# Patient Record
Sex: FEMALE | Race: WHITE | Employment: FULL TIME | URBAN - METROPOLITAN AREA
[De-identification: names, ages, dates, MRNs, and addresses within clinical notes are randomized per-mention and may not be internally consistent; named-entity substitution may affect disease eponyms.]

---

## 2017-06-07 ENCOUNTER — TELEPHONE (OUTPATIENT)
Dept: INTERNAL MEDICINE CLINIC | Facility: CLINIC | Age: 57
End: 2017-06-07

## 2017-06-07 DIAGNOSIS — Z12.39 SCREENING FOR MALIGNANT NEOPLASM OF BREAST: Primary | ICD-10-CM

## 2017-06-07 DIAGNOSIS — Z00.00 ROUTINE GENERAL MEDICAL EXAMINATION AT HEALTH CARE FACILITY: ICD-10-CM

## 2017-06-07 NOTE — TELEPHONE ENCOUNTER
Left message on pt's voice mail. Pt asked to call back with condition update. Last mammogram 7/8/16: Findings: Negative; Recommend: Screening mammogram in one year.

## 2017-06-07 NOTE — TELEPHONE ENCOUNTER
Patient phoned to schedule her physical, and this was done for 17. Patient did not get her labs done last year, as instructed by Dr. Ariane Burrows, and the orders are now . Does Dr. Ariane Burrows want her to go for these labs now?   If so, she'll need new ord

## 2017-07-10 NOTE — ADDENDUM NOTE
Encounter addended by: Wendy Ackerman LPN on: 2/47/2506  5:19 AM<BR>    Actions taken: Letter status changed

## 2017-07-14 ENCOUNTER — HOSPITAL ENCOUNTER (OUTPATIENT)
Dept: MAMMOGRAPHY | Age: 57
Discharge: HOME | End: 2017-07-14
Attending: INTERNAL MEDICINE
Payer: COMMERCIAL

## 2017-07-14 ENCOUNTER — LAB ENCOUNTER (OUTPATIENT)
Dept: LAB | Age: 57
End: 2017-07-14
Attending: INTERNAL MEDICINE
Payer: COMMERCIAL

## 2017-07-14 DIAGNOSIS — Z00.00 ROUTINE GENERAL MEDICAL EXAMINATION AT HEALTH CARE FACILITY: ICD-10-CM

## 2017-07-14 DIAGNOSIS — Z12.39 SCREENING FOR MALIGNANT NEOPLASM OF BREAST: ICD-10-CM

## 2017-07-14 LAB
ALBUMIN SERPL-MCNC: 4 G/DL (ref 3.5–4.8)
ALP LIVER SERPL-CCNC: 92 U/L (ref 46–118)
ALT SERPL-CCNC: 22 U/L (ref 14–54)
AST SERPL-CCNC: 16 U/L (ref 15–41)
BASOPHILS # BLD AUTO: 0.03 X10(3) UL (ref 0–0.1)
BASOPHILS NFR BLD AUTO: 0.8 %
BILIRUB SERPL-MCNC: 0.6 MG/DL (ref 0.1–2)
BUN BLD-MCNC: 13 MG/DL (ref 8–20)
CALCIUM BLD-MCNC: 9.6 MG/DL (ref 8.3–10.3)
CHLORIDE: 106 MMOL/L (ref 101–111)
CHOLEST SMN-MCNC: 201 MG/DL (ref ?–200)
CO2: 27 MMOL/L (ref 22–32)
CREAT BLD-MCNC: 0.85 MG/DL (ref 0.55–1.02)
EOSINOPHIL # BLD AUTO: 0.13 X10(3) UL (ref 0–0.3)
EOSINOPHIL NFR BLD AUTO: 3.3 %
ERYTHROCYTE [DISTWIDTH] IN BLOOD BY AUTOMATED COUNT: 12.2 % (ref 11.5–16)
GLUCOSE BLD-MCNC: 82 MG/DL (ref 70–99)
HCT VFR BLD AUTO: 40.4 % (ref 34–50)
HDLC SERPL-MCNC: 109 MG/DL (ref 45–?)
HDLC SERPL: 1.84 {RATIO} (ref ?–4.44)
HGB BLD-MCNC: 13.5 G/DL (ref 12–16)
IMMATURE GRANULOCYTE COUNT: 0.01 X10(3) UL (ref 0–1)
IMMATURE GRANULOCYTE RATIO %: 0.3 %
LDLC SERPL CALC-MCNC: 81 MG/DL (ref ?–130)
LYMPHOCYTES # BLD AUTO: 1.39 X10(3) UL (ref 0.9–4)
LYMPHOCYTES NFR BLD AUTO: 35.3 %
M PROTEIN MFR SERPL ELPH: 7.2 G/DL (ref 6.1–8.3)
MCH RBC QN AUTO: 33 PG (ref 27–33.2)
MCHC RBC AUTO-ENTMCNC: 33.4 G/DL (ref 31–37)
MCV RBC AUTO: 98.8 FL (ref 81–100)
MONOCYTES # BLD AUTO: 0.51 X10(3) UL (ref 0.1–0.6)
MONOCYTES NFR BLD AUTO: 12.9 %
NEUTROPHIL ABS PRELIM: 1.87 X10 (3) UL (ref 1.3–6.7)
NEUTROPHILS # BLD AUTO: 1.87 X10(3) UL (ref 1.3–6.7)
NEUTROPHILS NFR BLD AUTO: 47.4 %
NONHDLC SERPL-MCNC: 92 MG/DL (ref ?–130)
PLATELET # BLD AUTO: 231 10(3)UL (ref 150–450)
POTASSIUM SERPL-SCNC: 4.4 MMOL/L (ref 3.6–5.1)
RBC # BLD AUTO: 4.09 X10(6)UL (ref 3.8–5.1)
RED CELL DISTRIBUTION WIDTH-SD: 43.9 FL (ref 35.1–46.3)
SODIUM SERPL-SCNC: 141 MMOL/L (ref 136–144)
TRIGLYCERIDES: 54 MG/DL (ref ?–150)
TSI SER-ACNC: 1.13 MIU/ML (ref 0.35–5.5)
VLDL: 11 MG/DL (ref 5–40)
WBC # BLD AUTO: 3.9 X10(3) UL (ref 4–13)

## 2017-07-14 PROCEDURE — 36415 COLL VENOUS BLD VENIPUNCTURE: CPT | Performed by: INTERNAL MEDICINE

## 2017-07-14 PROCEDURE — 77067 SCR MAMMO BI INCL CAD: CPT | Performed by: INTERNAL MEDICINE

## 2017-07-14 PROCEDURE — 80061 LIPID PANEL: CPT | Performed by: INTERNAL MEDICINE

## 2017-07-14 PROCEDURE — 80050 GENERAL HEALTH PANEL: CPT | Performed by: INTERNAL MEDICINE

## 2017-08-24 ENCOUNTER — OFFICE VISIT (OUTPATIENT)
Dept: INTERNAL MEDICINE CLINIC | Facility: CLINIC | Age: 57
End: 2017-08-24

## 2017-08-24 VITALS
RESPIRATION RATE: 16 BRPM | TEMPERATURE: 98 F | OXYGEN SATURATION: 99 % | HEART RATE: 64 BPM | WEIGHT: 120 LBS | HEIGHT: 63.75 IN | SYSTOLIC BLOOD PRESSURE: 116 MMHG | BODY MASS INDEX: 20.74 KG/M2 | DIASTOLIC BLOOD PRESSURE: 76 MMHG

## 2017-08-24 DIAGNOSIS — Z12.4 PAP SMEAR FOR CERVICAL CANCER SCREENING: ICD-10-CM

## 2017-08-24 DIAGNOSIS — Z00.00 ROUTINE GENERAL MEDICAL EXAMINATION AT A HEALTH CARE FACILITY: Primary | ICD-10-CM

## 2017-08-24 PROCEDURE — 88175 CYTOPATH C/V AUTO FLUID REDO: CPT | Performed by: INTERNAL MEDICINE

## 2017-08-24 PROCEDURE — 99396 PREV VISIT EST AGE 40-64: CPT | Performed by: INTERNAL MEDICINE

## 2017-08-24 PROCEDURE — 87624 HPV HI-RISK TYP POOLED RSLT: CPT | Performed by: INTERNAL MEDICINE

## 2017-08-24 NOTE — PROGRESS NOTES
HPI:   Paulette Manzanares is a 65year old female who presents for a complete physical exam. \    Wt Readings from Last 6 Encounters:  08/24/17 : 120 lb  08/22/16 : 118 lb 14.4 oz  02/12/16 : 118 lb  11/03/15 : 116 lb 12.8 oz  03/02/15 : 115 lb  02/16/15 : 112 shoulder girdle pain not so bad lately  NEURO: denies headaches, dizziness  PSYCHE: denies depression or anxiety  HEMATOLOGIC: denies hx of anemia  ENDOCRINE: denies thyroid history  ALL/ASTHMA: denies hx of allergy or asthma    EXAM:   /76 (BP Locat for this visit. Meds & Refills for this Visit:    No prescriptions requested or ordered in this encounter    Imaging & Consults:  None    No Follow-up on file.

## 2017-08-29 LAB — HPV I/H RISK 1 DNA SPEC QL NAA+PROBE: NEGATIVE

## 2017-09-15 ENCOUNTER — TELEPHONE (OUTPATIENT)
Dept: INTERNAL MEDICINE CLINIC | Facility: CLINIC | Age: 57
End: 2017-09-15

## 2017-09-15 NOTE — TELEPHONE ENCOUNTER
Patient called, wanted to talk to a nurse, she is having a low grade fever on and off through out the week. Patient has a really bad cough, its constant and its hard to talk sleep and eat.  Patient wanted to know if she needs to come in or if she can try an

## 2017-09-15 NOTE — TELEPHONE ENCOUNTER
Spoke to pt, has cough x 1 week, feelws like is post nasal drip and a cold. Pt has been taking NYquil. Did not want to make OV. Advised to monitor symptoms and go to wic if worsening over the weekend.  Advised to push fluids, take motrin for fever and ac

## 2017-09-17 ENCOUNTER — HOSPITAL ENCOUNTER (OUTPATIENT)
Age: 57
Discharge: HOME | End: 2017-09-17
Attending: FAMILY MEDICINE
Payer: COMMERCIAL

## 2017-09-17 ENCOUNTER — APPOINTMENT (OUTPATIENT)
Dept: GENERAL RADIOLOGY | Age: 57
End: 2017-09-17
Attending: FAMILY MEDICINE
Payer: COMMERCIAL

## 2017-09-17 VITALS
SYSTOLIC BLOOD PRESSURE: 140 MMHG | WEIGHT: 120 LBS | TEMPERATURE: 100 F | RESPIRATION RATE: 16 BRPM | DIASTOLIC BLOOD PRESSURE: 90 MMHG | OXYGEN SATURATION: 98 % | HEART RATE: 81 BPM | HEIGHT: 64 IN | BODY MASS INDEX: 20.49 KG/M2

## 2017-09-17 DIAGNOSIS — J20.8 ACUTE BRONCHITIS DUE TO OTHER SPECIFIED ORGANISMS: Primary | ICD-10-CM

## 2017-09-17 PROCEDURE — 99213 OFFICE O/P EST LOW 20 MIN: CPT

## 2017-09-17 PROCEDURE — 99204 OFFICE O/P NEW MOD 45 MIN: CPT

## 2017-09-17 PROCEDURE — 71020 XR CHEST PA + LAT CHEST (CPT=71020): CPT | Performed by: FAMILY MEDICINE

## 2017-09-17 RX ORDER — GUAIFENESIN AND PSEUDOEPHEDRINE HCL 1200; 120 MG/1; MG/1
1 TABLET, EXTENDED RELEASE ORAL 2 TIMES DAILY
Qty: 28 TABLET | Refills: 0 | Status: SHIPPED | OUTPATIENT
Start: 2017-09-17 | End: 2017-09-24

## 2017-09-17 NOTE — ED INITIAL ASSESSMENT (HPI)
C/o dry cough, body aches, low grade fevers. C/o sinus congestion. Has been sick since Wednesday. Taking Nyquil, Mucinex and Advil Sinus.

## 2017-09-17 NOTE — ED PROVIDER NOTES
Patient Seen in: [address]    History   Patient presents with:  Cough/URI    Stated Complaint: cough headache     HPI    Patient with 4 days of nonproductive cough, headache, and generalized myalgia.   Patient has had tacti No murmur  Lungs: CTA bilateral. No wheezes rales or rhonchi. Skin: Warm and dry  Neuro: A&O x3      ED Course   Labs Reviewed - No data to display  Radiology results reviewed.  No Acute Findings  =========================================================== 0    Pseudoephedrine-Guaifenesin -1200 MG Oral Tablet 12 Hr  Take 1 tablet by mouth 2 (two) times daily.   Qty: 28 tablet Refills: 0

## 2017-09-18 ENCOUNTER — TELEPHONE (OUTPATIENT)
Dept: INTERNAL MEDICINE CLINIC | Facility: CLINIC | Age: 57
End: 2017-09-18

## 2017-09-18 NOTE — TELEPHONE ENCOUNTER
Spoke with pt. States that she is taking Cheratussin at night and it helps her cough. States that during the day she has persistent , non-productive cough. Denies  Chest pain, SOB or wheezing. Reviewed chest x-ray results See Imaging 9/17/17.   Pt states

## 2017-09-18 NOTE — TELEPHONE ENCOUNTER
[facility], states that since then she has had left side pain, not sure if it is from coughing? Please advise. Also pt is wondering if she needs antibiotic, was not given [facility].  Thank you

## 2017-11-28 ENCOUNTER — TELEPHONE (OUTPATIENT)
Dept: INTERNAL MEDICINE CLINIC | Facility: CLINIC | Age: 57
End: 2017-11-28

## 2017-11-28 NOTE — TELEPHONE ENCOUNTER
Patient had bronchitis in the fall and ended up hurting her ribs from coughing. She has started coughing again and has the same pain in her ribs. She is wondering what she can do for the pain. Please advise.

## 2017-11-28 NOTE — TELEPHONE ENCOUNTER
Spoke with pt, she has concerns with cost of urgent care.  No current appts for tomorrow, advised she call back or can schedule for Thursday and she can check in tomorrow and we can place her on wait list. Also advised that she could go to [facility] but they have

## 2017-11-28 NOTE — TELEPHONE ENCOUNTER
Spoke with pt, has had cough for 2 days, fever approx 101F, pt most concerned about her ribs hurting from coughing. No wheezing or sob. Able to speak in clear complete sentences. No coughing while on phone call.  Had bronchitis couple months ago and hurt ri

## 2017-11-29 ENCOUNTER — OFFICE VISIT (OUTPATIENT)
Dept: FAMILY MEDICINE CLINIC | Facility: CLINIC | Age: 57
End: 2017-11-29

## 2017-11-29 VITALS
HEART RATE: 96 BPM | SYSTOLIC BLOOD PRESSURE: 90 MMHG | HEIGHT: 63.75 IN | OXYGEN SATURATION: 98 % | DIASTOLIC BLOOD PRESSURE: 64 MMHG | WEIGHT: 120 LBS | TEMPERATURE: 99 F | RESPIRATION RATE: 16 BRPM | BODY MASS INDEX: 20.74 KG/M2

## 2017-11-29 DIAGNOSIS — R05.9 COUGH: ICD-10-CM

## 2017-11-29 DIAGNOSIS — R50.9 FEVER IN ADULT: ICD-10-CM

## 2017-11-29 DIAGNOSIS — J02.9 ACUTE PHARYNGITIS, UNSPECIFIED ETIOLOGY: Primary | ICD-10-CM

## 2017-11-29 PROCEDURE — 87880 STREP A ASSAY W/OPTIC: CPT | Performed by: NURSE PRACTITIONER

## 2017-11-29 PROCEDURE — 99213 OFFICE O/P EST LOW 20 MIN: CPT | Performed by: NURSE PRACTITIONER

## 2017-11-29 PROCEDURE — 87081 CULTURE SCREEN ONLY: CPT | Performed by: NURSE PRACTITIONER

## 2017-11-29 RX ORDER — BENZONATATE 200 MG/1
200 CAPSULE ORAL 3 TIMES DAILY PRN
Qty: 20 CAPSULE | Refills: 0 | Status: SHIPPED | OUTPATIENT
Start: 2017-11-29 | End: 2017-12-06

## 2017-11-29 RX ORDER — CODEINE PHOSPHATE AND GUAIFENESIN 10; 100 MG/5ML; MG/5ML
SOLUTION ORAL NIGHTLY PRN
Qty: 120 ML | Refills: 0 | Status: SHIPPED | OUTPATIENT
Start: 2017-11-29 | End: 2017-12-09

## 2017-11-29 RX ORDER — AZITHROMYCIN 250 MG/1
TABLET, FILM COATED ORAL
Qty: 6 TABLET | Refills: 0 | Status: SHIPPED | OUTPATIENT
Start: 2017-11-29 | End: 2018-08-28 | Stop reason: ALTCHOICE

## 2017-11-29 NOTE — PATIENT INSTRUCTIONS
Pharyngitis (Sore Throat), Report Pending    Pharyngitis (sore throat) is often due to a virus. It can also be caused by the streptococcus, or strep, bacterium, often called strep throat.  Both viral and strep infections can cause throat pain that is wors · For children: Use acetaminophen for fever, fussiness, or discomfort.  In infants older than 10months of age, you may use ibuprofen instead of acetaminophen. Talk with your child's healthcare provider before giving these medicines if your child has chronic · Signs of dehydration (very dark urine or no urine, sunken eyes, dizziness)  · Trouble breathing or noisy breathing  · Muffled voice  · New rash  · Child appears to be getting sicker  Date Last Reviewed: 4/13/2015  © 2714-9521 [employer].  8 If you are 72 or older, you should get a pneumococcal vaccine and a yearly flu (influenza) shot. You should also get these vaccines if you have chronic lung disease like asthma, emphysema, or COPD.  Recently, a second type of pneumonia vaccine has become av

## 2017-11-29 NOTE — PROGRESS NOTES
CHIEF COMPLAINT:   Patient presents with:  Sore Throat  Cough        HPI:   Elli Licona is a 65year old female presents to clinic with complaint of sore throat, fever, and cough. Patient has had for 3 days.  Patient reports following associated symptom NOSE: nostrils patent, no exudates, nasal mucosa pink and noninflamed  THROAT: oral mucosa pink, moist. Posterior pharynx erythematous and injected. No exudates. Tonsils +2/4. Breath is not malodorous.   No trismus, hoarseness, muffled voice, stridor, or u - azithromycin 250 MG Oral Tab; Take 2 tablets on day 1, then 1 tablet on days 2-5. Dispense: 6 tablet; Refill: 0  - benzonatate 200 MG Oral Cap; Take 1 capsule (200 mg total) by mouth 3 (three) times daily as needed for cough. Swallow whole.   Do not open · If the test is positive for strep, don't go to work or school for the first 2 days of taking the antibiotics. After this time, you will not be contagious.  You can then return to work or school if you are feeling better.   · Take the antibiotic medicine f ¨ Your child is of any age and has repeated fevers above 104°F (40°C). ¨ Your child is younger than 3years of age and has a fever of 100.4°F (38°C) that continues for more than 1 day.   ¨ Your child is 3years old or older and has a fever of 100.4°F (38°C · Drink 6 to 8 glasses of fluids every day to make sure you are getting enough fluids. Beverages can include water, sport drinks, sodas without caffeine, juices, tea, or soup. Fluids will help loosen secretions in the lung.  This will make it easier for you The patien indicates understanding of these issues and agrees to the plan.

## 2018-08-06 ENCOUNTER — TELEPHONE (OUTPATIENT)
Dept: INTERNAL MEDICINE CLINIC | Facility: CLINIC | Age: 58
End: 2018-08-06

## 2018-08-06 DIAGNOSIS — Z12.39 SCREENING FOR MALIGNANT NEOPLASM OF BREAST: Primary | ICD-10-CM

## 2018-08-06 NOTE — TELEPHONE ENCOUNTER
Patient was wondering if Dr. Bebe Frias would order mammo before PE on 8/28/18. Patient due for one. Okay to place? ? Routed to Dr. Bebe Frias.

## 2018-08-06 NOTE — TELEPHONE ENCOUNTER
Patient called, she went to schedule her mammogram, and they told her at central scheduling there was not one in the system. Patient had her last mammogram on July, wanted a order now since she is due.  Advised that she needs to have a physical scheduled, w

## 2018-08-10 ENCOUNTER — HOSPITAL ENCOUNTER (OUTPATIENT)
Dept: MAMMOGRAPHY | Age: 58
Discharge: HOME | End: 2018-08-10
Attending: INTERNAL MEDICINE
Payer: COMMERCIAL

## 2018-08-10 DIAGNOSIS — Z12.39 SCREENING FOR MALIGNANT NEOPLASM OF BREAST: ICD-10-CM

## 2018-08-10 PROCEDURE — 77067 SCR MAMMO BI INCL CAD: CPT | Performed by: INTERNAL MEDICINE

## 2018-08-10 PROCEDURE — 77063 BREAST TOMOSYNTHESIS BI: CPT | Performed by: INTERNAL MEDICINE

## 2018-08-28 ENCOUNTER — OFFICE VISIT (OUTPATIENT)
Dept: INTERNAL MEDICINE CLINIC | Facility: CLINIC | Age: 58
End: 2018-08-28
Payer: COMMERCIAL

## 2018-08-28 VITALS
TEMPERATURE: 98 F | HEIGHT: 64 IN | SYSTOLIC BLOOD PRESSURE: 116 MMHG | OXYGEN SATURATION: 98 % | HEART RATE: 62 BPM | RESPIRATION RATE: 14 BRPM | DIASTOLIC BLOOD PRESSURE: 68 MMHG | WEIGHT: 120.81 LBS | BODY MASS INDEX: 20.63 KG/M2

## 2018-08-28 DIAGNOSIS — Z00.00 ROUTINE GENERAL MEDICAL EXAMINATION AT A HEALTH CARE FACILITY: ICD-10-CM

## 2018-08-28 DIAGNOSIS — Z12.31 ENCOUNTER FOR SCREENING MAMMOGRAM FOR BREAST CANCER: Primary | ICD-10-CM

## 2018-08-28 DIAGNOSIS — Z12.4 ENCOUNTER FOR SCREENING FOR CERVICAL CANCER: ICD-10-CM

## 2018-08-28 DIAGNOSIS — M72.2 PLANTAR FASCIITIS OF RIGHT FOOT: ICD-10-CM

## 2018-08-28 PROCEDURE — 99396 PREV VISIT EST AGE 40-64: CPT | Performed by: INTERNAL MEDICINE

## 2018-08-28 NOTE — PROGRESS NOTES
HPI:   Devin Jane is a 63year old female who presents for a complete physical exam. \    Wt Readings from Last 6 Encounters:  08/28/18 : 120 lb 12.8 oz  11/29/17 : 120 lb  09/17/17 : 120 lb  08/24/17 : 120 lb  08/22/16 : 118 lb 14.4 oz  02/12/16 : 118 discharge or itching,, has stress incont even w/walking now. She avoids running and does kegel  MUSCULOSKELETAL: denies back pain, shoulder girdle pain not so bad lately.  Chronic right plantar fasciitis, does everything to control it  NEURO: denies headach year.    Encounter for screening mammogram for breast cancer  (primary encounter diagnosis)  Routine general medical examination at a health care facility  Encounter for screening for cervical cancer   Plantar fasciitis of right foot      Orders Placed Thi

## 2019-10-07 ENCOUNTER — TELEPHONE (OUTPATIENT)
Dept: INTERNAL MEDICINE CLINIC | Facility: CLINIC | Age: 59
End: 2019-10-07

## 2019-10-07 DIAGNOSIS — Z12.31 BREAST CANCER SCREENING BY MAMMOGRAM: Primary | ICD-10-CM

## 2019-10-07 NOTE — TELEPHONE ENCOUNTER
Per Chaim De La Torre from central scheduling, pt called to make appt for mammogram, her order  in August. Pt is looking for new order.  Thank you

## 2019-10-08 NOTE — TELEPHONE ENCOUNTER
Patient with PE appointment 19, received call from mammogram department that patient's previous mammogram order had . Patient requesting new order to have done prior to PE on 19. Super order pended, please advise. Thank you!

## 2019-10-08 NOTE — TELEPHONE ENCOUNTER
Pt has PE 11-26 with Dr Sasha Nava, would like orders for mammo prior to that appt. Please advise.  Thank you

## 2019-11-01 ENCOUNTER — HOSPITAL ENCOUNTER (OUTPATIENT)
Dept: MAMMOGRAPHY | Age: 59
Discharge: HOME | End: 2019-11-01
Attending: INTERNAL MEDICINE
Payer: COMMERCIAL

## 2019-11-01 DIAGNOSIS — Z12.31 BREAST CANCER SCREENING BY MAMMOGRAM: ICD-10-CM

## 2019-11-01 PROCEDURE — 77067 SCR MAMMO BI INCL CAD: CPT | Performed by: INTERNAL MEDICINE

## 2019-11-01 PROCEDURE — 77063 BREAST TOMOSYNTHESIS BI: CPT | Performed by: INTERNAL MEDICINE

## 2019-11-26 ENCOUNTER — OFFICE VISIT (OUTPATIENT)
Dept: INTERNAL MEDICINE CLINIC | Facility: CLINIC | Age: 59
End: 2019-11-26
Payer: COMMERCIAL

## 2019-11-26 VITALS
RESPIRATION RATE: 14 BRPM | HEIGHT: 64 IN | OXYGEN SATURATION: 99 % | DIASTOLIC BLOOD PRESSURE: 80 MMHG | WEIGHT: 114.88 LBS | SYSTOLIC BLOOD PRESSURE: 112 MMHG | BODY MASS INDEX: 19.61 KG/M2 | HEART RATE: 76 BPM

## 2019-11-26 DIAGNOSIS — Z12.31 BREAST CANCER SCREENING BY MAMMOGRAM: ICD-10-CM

## 2019-11-26 DIAGNOSIS — Z12.11 COLON CANCER SCREENING: ICD-10-CM

## 2019-11-26 DIAGNOSIS — M53.9 CERVICAL DYSFUNCTION: ICD-10-CM

## 2019-11-26 DIAGNOSIS — Z00.00 ROUTINE GENERAL MEDICAL EXAMINATION AT A HEALTH CARE FACILITY: Primary | ICD-10-CM

## 2019-11-26 DIAGNOSIS — Z78.0 POSTMENOPAUSAL: ICD-10-CM

## 2019-11-26 PROCEDURE — 99396 PREV VISIT EST AGE 40-64: CPT | Performed by: INTERNAL MEDICINE

## 2019-11-26 RX ORDER — PHENOL 1.4 %
1 AEROSOL, SPRAY (ML) MUCOUS MEMBRANE
COMMUNITY

## 2019-11-26 NOTE — PROGRESS NOTES
HPI:   Baron Cueva is a 63year old female who presents for a complete physical exam. \    Wt Readings from Last 6 Encounters:  11/26/19 : 114 lb 14.4 oz (52.1 kg)  08/28/18 : 120 lb 12.8 oz (54.8 kg)  11/29/17 : 120 lb (54.4 kg)  09/17/17 : 120 lb (54.4 ST  LUNGS: denies shortness of breath with exertion  CARDIOVASCULAR: denies chest pain on exertion  GI: denies abdominal pain,denies heartburn, change in bm's, bloody stools,   : denies dysuria, vaginal discharge or itching,, has stress incont but not pr 1 year.     Routine general medical examination at a health care facility  (primary encounter diagnosis)  Breast cancer screening by mammogram  Colon cancer screening  Cervical dysfunction  Postmenopausal    Orders Placed This Encounter      CMP      CBC Wi

## 2019-12-18 ENCOUNTER — LAB ENCOUNTER (OUTPATIENT)
Dept: LAB | Age: 59
End: 2019-12-18
Attending: INTERNAL MEDICINE
Payer: COMMERCIAL

## 2019-12-18 DIAGNOSIS — Z00.00 ROUTINE GENERAL MEDICAL EXAMINATION AT A HEALTH CARE FACILITY: ICD-10-CM

## 2019-12-18 PROCEDURE — 80050 GENERAL HEALTH PANEL: CPT | Performed by: INTERNAL MEDICINE

## 2019-12-18 PROCEDURE — 36415 COLL VENOUS BLD VENIPUNCTURE: CPT | Performed by: INTERNAL MEDICINE

## 2019-12-18 PROCEDURE — 80061 LIPID PANEL: CPT | Performed by: INTERNAL MEDICINE

## 2019-12-18 PROCEDURE — 86803 HEPATITIS C AB TEST: CPT | Performed by: INTERNAL MEDICINE

## 2019-12-19 DIAGNOSIS — D72.819 LEUKOPENIA, UNSPECIFIED TYPE: Primary | ICD-10-CM

## 2020-01-04 ENCOUNTER — HOSPITAL ENCOUNTER (OUTPATIENT)
Dept: BONE DENSITY | Age: 60
Discharge: HOME | End: 2020-01-04
Attending: INTERNAL MEDICINE
Payer: COMMERCIAL

## 2020-01-04 DIAGNOSIS — Z78.0 POSTMENOPAUSAL: ICD-10-CM

## 2020-01-04 PROCEDURE — 77080 DXA BONE DENSITY AXIAL: CPT | Performed by: INTERNAL MEDICINE

## 2020-01-06 DIAGNOSIS — M81.0 OSTEOPOROSIS, UNSPECIFIED OSTEOPOROSIS TYPE, UNSPECIFIED PATHOLOGICAL FRACTURE PRESENCE: Primary | ICD-10-CM

## 2020-01-07 ENCOUNTER — APPOINTMENT (OUTPATIENT)
Dept: LAB | Age: 60
End: 2020-01-07
Attending: INTERNAL MEDICINE
Payer: COMMERCIAL

## 2020-01-07 DIAGNOSIS — M81.0 OSTEOPOROSIS, UNSPECIFIED OSTEOPOROSIS TYPE, UNSPECIFIED PATHOLOGICAL FRACTURE PRESENCE: ICD-10-CM

## 2020-01-07 LAB — VIT D+METAB SERPL-MCNC: 20.3 NG/ML (ref 30–100)

## 2020-01-07 PROCEDURE — 82306 VITAMIN D 25 HYDROXY: CPT | Performed by: INTERNAL MEDICINE

## 2020-01-07 PROCEDURE — 36415 COLL VENOUS BLD VENIPUNCTURE: CPT | Performed by: INTERNAL MEDICINE

## 2020-01-09 ENCOUNTER — OFFICE VISIT (OUTPATIENT)
Dept: INTERNAL MEDICINE CLINIC | Facility: CLINIC | Age: 60
End: 2020-01-09
Payer: COMMERCIAL

## 2020-01-09 VITALS
DIASTOLIC BLOOD PRESSURE: 70 MMHG | WEIGHT: 118.31 LBS | HEIGHT: 64 IN | SYSTOLIC BLOOD PRESSURE: 108 MMHG | BODY MASS INDEX: 20.2 KG/M2 | HEART RATE: 59 BPM | RESPIRATION RATE: 14 BRPM | OXYGEN SATURATION: 98 %

## 2020-01-09 DIAGNOSIS — M81.0 AGE-RELATED OSTEOPOROSIS WITHOUT CURRENT PATHOLOGICAL FRACTURE: Primary | ICD-10-CM

## 2020-01-09 PROBLEM — R79.89 LOW VITAMIN D LEVEL: Status: ACTIVE | Noted: 2020-01-09

## 2020-01-09 PROCEDURE — 99214 OFFICE O/P EST MOD 30 MIN: CPT | Performed by: INTERNAL MEDICINE

## 2020-01-09 RX ORDER — CHOLECALCIFEROL (VITAMIN D3) 25 MCG
CAPSULE ORAL
Qty: 60 CAPSULE | Refills: 0 | Status: CANCELLED | OUTPATIENT
Start: 2020-01-09 | End: 2020-02-08

## 2020-01-09 RX ORDER — MELATONIN
1000 DAILY
Qty: 30 TABLET | Refills: 0 | Status: CANCELLED | OUTPATIENT
Start: 2020-01-09 | End: 2020-02-08

## 2020-01-09 RX ORDER — GLUCOSAMINE HCL 500 MG
1 TABLET ORAL DAILY
Qty: 90 TABLET | Refills: 1 | Status: SHIPPED | OUTPATIENT
Start: 2020-01-09

## 2020-01-09 RX ORDER — GLUCOSAMINE HCL 500 MG
1 TABLET ORAL DAILY
COMMUNITY
End: 2020-01-09

## 2020-01-09 NOTE — PROGRESS NOTES
Bear Ma is a 63year old female  Patient presents with:  Test Results: Dexa  Lab Results: Vitamin D      HPI:   She had a DEXA and it showed OP in LS spine and FN  She has no hx of fx  She does yoga, pilates and walks, and always has, at school w/tea GENERAL: well developed, well nourished,in no apparent distress      Xr Dexa Bone Densitometry (cpt=77080)    Result Date: 1/4/2020  PROCEDURE:  XR DEXA BONE DENSITOMETRY (CPT=77080)  COMPARISON:  None.   INDICATIONS:    Z78.0 Asymptomatic menopausal state Age-related osteoporosis without current pathological fracture  (primary encounter diagnosis)- she is at low risk for falling. We have discussed fall precautions and prevention. She will beef up her exercise to include more WB and more weights.  We will buf Source   Calcium (mg) per serving   Source   Calcium (mg) per serving   Source   Calcium (mg) per serving   Low-fat yogurt, plain   415 mg/8 oz.   Sardines, Atlantic, canned, with bones   351 mg/3 oz.   Oatmeal, instant, fortified   215 mg/1 cup   Nonfat m · Smoking. This reduces bone mass. Smoking may also interfere with estrogen levels and cause early menopause. · Inactivity. Not being active makes your bones lose strength and become thinner. Over time, thin bones may break.  Women who aren't active are at You may need this test if your healthcare provider wants to check your vitamin D levels to find out if you have any risks to bone health.  These might be:  · Low calcium  · Soft bones caused by low vitamin D or problems using it (osteomalacia)  · Osteopenia The test is done with a blood sample. A needle is used to draw blood from a vein in your arm or hand.   Does this test pose any risks? Having a blood test with a needle carries some risks.  These include bleeding, infection, bruising, and feeling lighthead Weight-bearing activities. These help your whole body. They also help you maintain bone mass. Activities include walking, dancing, and housework. Non-weight-bearing exercises. These help prevent back strain and pain.  They do this by building the trunk and Your spine is made up of many bones called vertebrae. Osteoporosis can cause the vertebrae in your spine to collapse. As a result, your upper back may arch forward, creating a curvature. Spine fractures may also result from back strain and bad posture.  You Changes in hormone levels, activity, medicines, or diet can affect the bone-making system. When the system gets out of balance, the amount of bone lost is greater than the amount of bone made. This can cause osteopenia.  It is when bone starts to become les · At least 30 minutes to 1 hour before any food, drink, or other medicines. · While sitting or standing. You should not lie down for at least 30 minutes after taking the medicine. Newer medicines can be taken weekly or monthly.  Talk with your healthcare © 9764-0897 [employer]. [address] [facility], [address] [place]. All rights reserved. This information is not intended as a substitute for professional medical care. Always follow your healthcare professional's instructions.         Eugene Rucker

## 2020-01-09 NOTE — PATIENT INSTRUCTIONS
Preventing Osteoporosis: Meeting Your Calcium Needs    Your body needs calcium to build and repair bones. But it can't make calcium on its own. That's why it's important to eat calcium-rich foods. Some foods are naturally rich in calcium.  Others have delfina Preventing Osteoporosis: Avoiding Bone Loss  Certain factors can speed up bone loss or decrease bone growth. For example, alcohol, cigarettes, and certain medicines reduce bone mass. Some foods make it hard for your body to absorb calcium.     Things to kalen Vitamin D comes in several forms. When ultraviolet light, such as sunlight, hits your skin, it creates vitamin D3. D2 is used to fortify dairy foods. Both of these are further processed by your liver and kidneys into a form your body can use.  Most tests fo · Not making enough vitamin D on your own  · Not getting enough vitamin D in your diet  · Not absorbing vitamin D from your food as you should  Lower levels may also mean that your body is not converting the vitamin as it should.  This might be because of k If you have osteoporosis, exercise is vital for your health. It can prevent bone fractures and spine changes. It will slow bone loss. Exercise will strengthen your body. It can also be fun.  A variety of exercises is best. See below for exercises that can h The most common fracture sites in people with osteoporosis are the wrist, spine, and hip. These fractures are often caused by accidents and falls. All fractures are painful and may limit what you can do. But hip fractures are very serious.  They often need The body is always losing (resorbing) and making bone. This process is called remodeling. Bone-resorbing cells take bone apart. They do this so the minerals can be used to repair an injury or make new bone.  Bone-making cells form new bone using calcium and · Increasing bone density in the hip and spine  · Reducing risk of fractures in the spine, hip, and wrist  Side effects may include:  · Heartburn  · Nausea  · Belly (abdominal) pain  · Bone or muscle pain  Taking bisphosphonates pills  Always read medicine Always read medicine information closely. You should not take bisphosphonates if you currently have upper gastrointestinal disease. Certain bisphosphonates must be taken:  · On an empty stomach.   · With a full glass of water (8 oz.) first thing in the morn · While sitting or standing. You should not lie down for at least 30 minutes after taking the medicine. Newer medicines can be taken weekly or monthly.  Talk with your healthcare provider to find out which one is right for you.   Date Last Reviewed: 5/1/20

## 2020-03-10 ENCOUNTER — TELEPHONE (OUTPATIENT)
Dept: INTERNAL MEDICINE CLINIC | Facility: CLINIC | Age: 60
End: 2020-03-10

## 2020-03-10 ENCOUNTER — OFFICE VISIT (OUTPATIENT)
Dept: INTERNAL MEDICINE CLINIC | Facility: CLINIC | Age: 60
End: 2020-03-10
Payer: COMMERCIAL

## 2020-03-10 VITALS
RESPIRATION RATE: 14 BRPM | HEIGHT: 64 IN | SYSTOLIC BLOOD PRESSURE: 102 MMHG | HEART RATE: 61 BPM | DIASTOLIC BLOOD PRESSURE: 64 MMHG | WEIGHT: 120.38 LBS | OXYGEN SATURATION: 98 % | BODY MASS INDEX: 20.55 KG/M2 | TEMPERATURE: 98 F

## 2020-03-10 DIAGNOSIS — N30.01 ACUTE CYSTITIS WITH HEMATURIA: Primary | ICD-10-CM

## 2020-03-10 LAB
APPEARANCE: CLEAR
BILIRUBIN: NEGATIVE
GLUCOSE (URINE DIPSTICK): NEGATIVE MG/DL
KETONES (URINE DIPSTICK): NEGATIVE MG/DL
MULTISTIX LOT#: NORMAL NUMERIC
NITRITE, URINE: NEGATIVE
PH, URINE: 7 (ref 4.5–8)
PROTEIN (URINE DIPSTICK): NEGATIVE MG/DL
SPECIFIC GRAVITY: 1.01 (ref 1–1.03)
URINE-COLOR: YELLOW
UROBILINOGEN,SEMI-QN: 0.2 MG/DL (ref 0–1.9)

## 2020-03-10 PROCEDURE — 87086 URINE CULTURE/COLONY COUNT: CPT | Performed by: INTERNAL MEDICINE

## 2020-03-10 PROCEDURE — 87088 URINE BACTERIA CULTURE: CPT | Performed by: INTERNAL MEDICINE

## 2020-03-10 PROCEDURE — 99213 OFFICE O/P EST LOW 20 MIN: CPT | Performed by: INTERNAL MEDICINE

## 2020-03-10 PROCEDURE — 81003 URINALYSIS AUTO W/O SCOPE: CPT | Performed by: INTERNAL MEDICINE

## 2020-03-10 PROCEDURE — 87186 SC STD MICRODIL/AGAR DIL: CPT | Performed by: INTERNAL MEDICINE

## 2020-03-10 RX ORDER — NITROFURANTOIN 25; 75 MG/1; MG/1
100 CAPSULE ORAL 2 TIMES DAILY
Qty: 14 CAPSULE | Refills: 0 | Status: SHIPPED | OUTPATIENT
Start: 2020-03-10 | End: 2020-03-17

## 2020-03-10 NOTE — PROGRESS NOTES
freq urination and burning. Established Patient Progress Note  Chief Complaint:   Patient presents with:  UTI: Started 3/9/2020 - Freq urination and burning.       HPI:   This is a 62year old female coming in for complaint of frequent urination and burni 0.05 % External Ointment Apply 1 Dose topically as needed.   2         REVIEW OF SYSTEMS:   See HPI  EXAM:   /64 (BP Location: Right arm, Patient Position: Sitting, Cuff Size: adult)   Pulse 61   Temp 98.2 °F (36.8 °C) (Oral)   Resp 14   Ht 64\"   Wt encounter diagnosis)    Patient's signs and symptoms of frequent urination, urinary urgency, and burning with urination with urinalysis positive for blood and leukocyte Estrace are concerning for UTI.   Will empirically start Macrobid twice daily for 7 days

## 2021-03-04 ENCOUNTER — OFFICE VISIT (OUTPATIENT)
Dept: INTERNAL MEDICINE CLINIC | Facility: CLINIC | Age: 61
End: 2021-03-04
Payer: COMMERCIAL

## 2021-03-04 VITALS
WEIGHT: 114.38 LBS | RESPIRATION RATE: 14 BRPM | DIASTOLIC BLOOD PRESSURE: 78 MMHG | OXYGEN SATURATION: 100 % | TEMPERATURE: 97 F | SYSTOLIC BLOOD PRESSURE: 118 MMHG | BODY MASS INDEX: 19.53 KG/M2 | HEIGHT: 64 IN | HEART RATE: 77 BPM

## 2021-03-04 DIAGNOSIS — Z00.00 ROUTINE GENERAL MEDICAL EXAMINATION AT A HEALTH CARE FACILITY: Primary | ICD-10-CM

## 2021-03-04 DIAGNOSIS — Z12.31 BREAST CANCER SCREENING BY MAMMOGRAM: ICD-10-CM

## 2021-03-04 DIAGNOSIS — Z12.11 COLON CANCER SCREENING: ICD-10-CM

## 2021-03-04 DIAGNOSIS — M81.0 AGE-RELATED OSTEOPOROSIS WITHOUT CURRENT PATHOLOGICAL FRACTURE: ICD-10-CM

## 2021-03-04 PROCEDURE — 99396 PREV VISIT EST AGE 40-64: CPT | Performed by: INTERNAL MEDICINE

## 2021-03-04 PROCEDURE — 3074F SYST BP LT 130 MM HG: CPT | Performed by: INTERNAL MEDICINE

## 2021-03-04 PROCEDURE — 3078F DIAST BP <80 MM HG: CPT | Performed by: INTERNAL MEDICINE

## 2021-03-04 PROCEDURE — 3008F BODY MASS INDEX DOCD: CPT | Performed by: INTERNAL MEDICINE

## 2021-03-04 NOTE — PROGRESS NOTES
HPI:   Hugo Ruth is a 62year old female who presents for a complete physical exam. \    Wt Readings from Last 6 Encounters:  03/04/21 : 114 lb 6.4 oz (51.9 kg)  03/10/20 : 120 lb 6.4 oz (54.6 kg)  01/09/20 : 118 lb 4.8 oz (53.7 kg)  11/26/19 : 114 lb tom : M. Children: 4,all adult  Has new grandson Noni Marx!  In [facility], Exercise regular not much since COVID     REVIEW OF SYSTEMS:     ROS:  CONSTITUTIONAL: no night sweats, fevers, unexplained wt loss, r  SKIN no rashes, suspicious or changing l bruits  BREAST: no dominant or suspicious mass, no nipple d/c, inversion or drainage, no axillary LA  LUNGS: clear to auscultation and percussion  CARDIO: RRR without murmur or gallop  GI: good BS's,no masses, HSM or tenderness  :introitus is normal,scan

## 2021-06-09 ENCOUNTER — HOSPITAL ENCOUNTER (OUTPATIENT)
Dept: MAMMOGRAPHY | Age: 61
Discharge: HOME | End: 2021-06-09
Attending: INTERNAL MEDICINE
Payer: COMMERCIAL

## 2021-06-09 ENCOUNTER — LAB ENCOUNTER (OUTPATIENT)
Dept: LAB | Age: 61
End: 2021-06-09
Attending: INTERNAL MEDICINE
Payer: COMMERCIAL

## 2021-06-09 DIAGNOSIS — Z12.31 BREAST CANCER SCREENING BY MAMMOGRAM: ICD-10-CM

## 2021-06-09 DIAGNOSIS — Z00.00 ROUTINE GENERAL MEDICAL EXAMINATION AT A HEALTH CARE FACILITY: ICD-10-CM

## 2021-06-09 PROCEDURE — 80050 GENERAL HEALTH PANEL: CPT | Performed by: INTERNAL MEDICINE

## 2021-06-09 PROCEDURE — 77063 BREAST TOMOSYNTHESIS BI: CPT | Performed by: INTERNAL MEDICINE

## 2021-06-09 PROCEDURE — 82306 VITAMIN D 25 HYDROXY: CPT | Performed by: INTERNAL MEDICINE

## 2021-06-09 PROCEDURE — 77067 SCR MAMMO BI INCL CAD: CPT | Performed by: INTERNAL MEDICINE

## 2021-06-09 PROCEDURE — 80061 LIPID PANEL: CPT | Performed by: INTERNAL MEDICINE

## 2021-06-09 NOTE — PROGRESS NOTES
Spoke to patient, aware of results and recommendations. Patient states she has vitamin D at home 3000 but stated she does not take it daily like she should. Advised she start taking the vitamin to help increase her levels.  Patient voice understandings and

## 2022-05-23 ENCOUNTER — OFFICE VISIT (OUTPATIENT)
Dept: INTERNAL MEDICINE CLINIC | Facility: CLINIC | Age: 62
End: 2022-05-23
Payer: COMMERCIAL

## 2022-05-23 VITALS
TEMPERATURE: 97 F | SYSTOLIC BLOOD PRESSURE: 114 MMHG | WEIGHT: 118.13 LBS | HEIGHT: 63.82 IN | HEART RATE: 73 BPM | DIASTOLIC BLOOD PRESSURE: 72 MMHG | BODY MASS INDEX: 20.42 KG/M2 | RESPIRATION RATE: 14 BRPM | OXYGEN SATURATION: 98 %

## 2022-05-23 DIAGNOSIS — Z12.4 CERVICAL CANCER SCREENING: ICD-10-CM

## 2022-05-23 DIAGNOSIS — M81.0 AGE-RELATED OSTEOPOROSIS WITHOUT CURRENT PATHOLOGICAL FRACTURE: ICD-10-CM

## 2022-05-23 DIAGNOSIS — Z78.0 POSTMENOPAUSAL: ICD-10-CM

## 2022-05-23 DIAGNOSIS — Z00.00 ROUTINE GENERAL MEDICAL EXAMINATION AT A HEALTH CARE FACILITY: Primary | ICD-10-CM

## 2022-05-23 DIAGNOSIS — Z12.31 SCREENING MAMMOGRAM FOR BREAST CANCER: ICD-10-CM

## 2022-05-23 DIAGNOSIS — Z12.11 ENCOUNTER FOR SCREENING FECAL OCCULT BLOOD TESTING: ICD-10-CM

## 2022-05-23 DIAGNOSIS — Z12.11 COLON CANCER SCREENING: ICD-10-CM

## 2022-05-23 DIAGNOSIS — Z23 NEED FOR VACCINATION: ICD-10-CM

## 2022-05-23 PROBLEM — Z86.010 HISTORY OF ADENOMATOUS POLYP OF COLON: Status: ACTIVE | Noted: 2022-05-23

## 2022-05-23 PROBLEM — Z86.0101 HISTORY OF ADENOMATOUS POLYP OF COLON: Status: ACTIVE | Noted: 2022-05-23

## 2022-05-23 PROCEDURE — 3008F BODY MASS INDEX DOCD: CPT | Performed by: INTERNAL MEDICINE

## 2022-05-23 PROCEDURE — 90471 IMMUNIZATION ADMIN: CPT | Performed by: INTERNAL MEDICINE

## 2022-05-23 PROCEDURE — 90714 TD VACC NO PRESV 7 YRS+ IM: CPT | Performed by: INTERNAL MEDICINE

## 2022-05-23 PROCEDURE — 82272 OCCULT BLD FECES 1-3 TESTS: CPT | Performed by: INTERNAL MEDICINE

## 2022-05-23 PROCEDURE — 99000 SPECIMEN HANDLING OFFICE-LAB: CPT | Performed by: INTERNAL MEDICINE

## 2022-05-23 PROCEDURE — 99396 PREV VISIT EST AGE 40-64: CPT | Performed by: INTERNAL MEDICINE

## 2022-05-23 PROCEDURE — 87624 HPV HI-RISK TYP POOLED RSLT: CPT | Performed by: INTERNAL MEDICINE

## 2022-05-23 PROCEDURE — 3078F DIAST BP <80 MM HG: CPT | Performed by: INTERNAL MEDICINE

## 2022-05-23 PROCEDURE — 3074F SYST BP LT 130 MM HG: CPT | Performed by: INTERNAL MEDICINE

## 2022-05-26 LAB — HPV I/H RISK 1 DNA SPEC QL NAA+PROBE: NEGATIVE

## 2022-06-14 ENCOUNTER — HOSPITAL ENCOUNTER (OUTPATIENT)
Dept: MAMMOGRAPHY | Age: 62
Discharge: HOME | End: 2022-06-14
Attending: INTERNAL MEDICINE
Payer: COMMERCIAL

## 2022-06-14 DIAGNOSIS — Z12.31 SCREENING MAMMOGRAM FOR BREAST CANCER: ICD-10-CM

## 2022-06-14 PROCEDURE — 77063 BREAST TOMOSYNTHESIS BI: CPT | Performed by: INTERNAL MEDICINE

## 2022-06-14 PROCEDURE — 77067 SCR MAMMO BI INCL CAD: CPT | Performed by: INTERNAL MEDICINE

## 2022-06-21 ENCOUNTER — HOSPITAL ENCOUNTER (OUTPATIENT)
Dept: BONE DENSITY | Age: 62
Discharge: HOME | End: 2022-06-21
Attending: INTERNAL MEDICINE
Payer: COMMERCIAL

## 2022-06-21 DIAGNOSIS — Z78.0 POSTMENOPAUSAL: ICD-10-CM

## 2022-06-21 DIAGNOSIS — M81.0 OSTEOPOROSIS, UNSPECIFIED OSTEOPOROSIS TYPE, UNSPECIFIED PATHOLOGICAL FRACTURE PRESENCE: Primary | ICD-10-CM

## 2022-06-21 PROCEDURE — 77080 DXA BONE DENSITY AXIAL: CPT | Performed by: INTERNAL MEDICINE

## 2022-06-22 ENCOUNTER — LAB ENCOUNTER (OUTPATIENT)
Dept: LAB | Age: 62
End: 2022-06-22
Attending: INTERNAL MEDICINE
Payer: COMMERCIAL

## 2022-06-22 DIAGNOSIS — Z00.00 ROUTINE GENERAL MEDICAL EXAMINATION AT A HEALTH CARE FACILITY: ICD-10-CM

## 2022-06-22 LAB
ALBUMIN SERPL-MCNC: 3.7 G/DL (ref 3.4–5)
ALBUMIN/GLOB SERPL: 1.1 {RATIO} (ref 1–2)
ALP LIVER SERPL-CCNC: 86 U/L
ALT SERPL-CCNC: 21 U/L
ANION GAP SERPL CALC-SCNC: 5 MMOL/L (ref 0–18)
AST SERPL-CCNC: 18 U/L (ref 15–37)
BASOPHILS # BLD AUTO: 0.02 X10(3) UL (ref 0–0.2)
BASOPHILS NFR BLD AUTO: 0.5 %
BILIRUB SERPL-MCNC: 0.6 MG/DL (ref 0.1–2)
BUN BLD-MCNC: 12 MG/DL (ref 7–18)
CALCIUM BLD-MCNC: 9.4 MG/DL (ref 8.5–10.1)
CHLORIDE SERPL-SCNC: 107 MMOL/L (ref 98–112)
CHOLEST SERPL-MCNC: 208 MG/DL (ref ?–200)
CO2 SERPL-SCNC: 28 MMOL/L (ref 21–32)
CREAT BLD-MCNC: 0.73 MG/DL
EOSINOPHIL # BLD AUTO: 0.1 X10(3) UL (ref 0–0.7)
EOSINOPHIL NFR BLD AUTO: 2.7 %
ERYTHROCYTE [DISTWIDTH] IN BLOOD BY AUTOMATED COUNT: 12.2 %
FASTING PATIENT LIPID ANSWER: YES
FASTING STATUS PATIENT QL REPORTED: YES
GLOBULIN PLAS-MCNC: 3.3 G/DL (ref 2.8–4.4)
GLUCOSE BLD-MCNC: 83 MG/DL (ref 70–99)
HCT VFR BLD AUTO: 40 %
HDLC SERPL-MCNC: 106 MG/DL (ref 40–59)
HGB BLD-MCNC: 13.4 G/DL
IMM GRANULOCYTES # BLD AUTO: 0.01 X10(3) UL (ref 0–1)
IMM GRANULOCYTES NFR BLD: 0.3 %
LDLC SERPL CALC-MCNC: 91 MG/DL (ref ?–100)
LYMPHOCYTES # BLD AUTO: 1.42 X10(3) UL (ref 1–4)
LYMPHOCYTES NFR BLD AUTO: 37.8 %
MCH RBC QN AUTO: 32.6 PG (ref 26–34)
MCHC RBC AUTO-ENTMCNC: 33.5 G/DL (ref 31–37)
MCV RBC AUTO: 97.3 FL
MONOCYTES # BLD AUTO: 0.51 X10(3) UL (ref 0.1–1)
MONOCYTES NFR BLD AUTO: 13.6 %
NEUTROPHILS # BLD AUTO: 1.7 X10 (3) UL (ref 1.5–7.7)
NEUTROPHILS # BLD AUTO: 1.7 X10(3) UL (ref 1.5–7.7)
NEUTROPHILS NFR BLD AUTO: 45.1 %
NONHDLC SERPL-MCNC: 102 MG/DL (ref ?–130)
OSMOLALITY SERPL CALC.SUM OF ELEC: 289 MOSM/KG (ref 275–295)
PLATELET # BLD AUTO: 239 10(3)UL (ref 150–450)
POTASSIUM SERPL-SCNC: 4.1 MMOL/L (ref 3.5–5.1)
PROT SERPL-MCNC: 7 G/DL (ref 6.4–8.2)
RBC # BLD AUTO: 4.11 X10(6)UL
SODIUM SERPL-SCNC: 140 MMOL/L (ref 136–145)
TRIGL SERPL-MCNC: 63 MG/DL (ref 30–149)
TSI SER-ACNC: 1.54 MIU/ML (ref 0.36–3.74)
VIT D+METAB SERPL-MCNC: 31.7 NG/ML (ref 30–100)
VLDLC SERPL CALC-MCNC: 10 MG/DL (ref 0–30)
WBC # BLD AUTO: 3.8 X10(3) UL (ref 4–11)

## 2022-06-22 PROCEDURE — 82306 VITAMIN D 25 HYDROXY: CPT | Performed by: INTERNAL MEDICINE

## 2022-06-22 PROCEDURE — 80050 GENERAL HEALTH PANEL: CPT | Performed by: INTERNAL MEDICINE

## 2022-06-22 PROCEDURE — 80061 LIPID PANEL: CPT | Performed by: INTERNAL MEDICINE

## 2022-08-09 ENCOUNTER — OFFICE VISIT (OUTPATIENT)
Dept: ENDOCRINOLOGY CLINIC | Facility: CLINIC | Age: 62
End: 2022-08-09
Payer: COMMERCIAL

## 2022-08-09 VITALS
WEIGHT: 116.19 LBS | BODY MASS INDEX: 20 KG/M2 | DIASTOLIC BLOOD PRESSURE: 68 MMHG | SYSTOLIC BLOOD PRESSURE: 108 MMHG | HEART RATE: 101 BPM

## 2022-08-09 DIAGNOSIS — R79.89 LOW VITAMIN D LEVEL: ICD-10-CM

## 2022-08-09 DIAGNOSIS — M81.0 AGE-RELATED OSTEOPOROSIS WITHOUT CURRENT PATHOLOGICAL FRACTURE: Primary | ICD-10-CM

## 2022-08-09 PROCEDURE — 3078F DIAST BP <80 MM HG: CPT | Performed by: STUDENT IN AN ORGANIZED HEALTH CARE EDUCATION/TRAINING PROGRAM

## 2022-08-09 PROCEDURE — 99204 OFFICE O/P NEW MOD 45 MIN: CPT | Performed by: STUDENT IN AN ORGANIZED HEALTH CARE EDUCATION/TRAINING PROGRAM

## 2022-08-09 PROCEDURE — 3074F SYST BP LT 130 MM HG: CPT | Performed by: STUDENT IN AN ORGANIZED HEALTH CARE EDUCATION/TRAINING PROGRAM

## 2022-08-09 RX ORDER — ALENDRONATE SODIUM 70 MG/1
70 TABLET ORAL
Qty: 12 TABLET | Refills: 3 | Status: SHIPPED | OUTPATIENT
Start: 2022-08-09

## 2022-08-09 NOTE — PATIENT INSTRUCTIONS
Weekly fosamax  Continue exercise  Vit D 1000-2000IU  Ca 1200mg total (diet + supplement)  Repeat DXA in summer of 2024

## 2022-10-10 PROBLEM — D12.3 BENIGN NEOPLASM OF TRANSVERSE COLON: Status: ACTIVE | Noted: 2022-10-10

## 2022-10-10 PROBLEM — Z83.71 FAMILY HISTORY OF COLONIC POLYPS: Status: ACTIVE | Noted: 2022-10-10

## 2022-10-10 PROBLEM — Z83.719 FAMILY HISTORY OF COLONIC POLYPS: Status: ACTIVE | Noted: 2022-10-10

## 2023-06-20 ENCOUNTER — OFFICE VISIT (OUTPATIENT)
Dept: INTERNAL MEDICINE CLINIC | Facility: CLINIC | Age: 63
End: 2023-06-20
Payer: COMMERCIAL

## 2023-06-20 VITALS
HEART RATE: 60 BPM | WEIGHT: 117.63 LBS | DIASTOLIC BLOOD PRESSURE: 70 MMHG | SYSTOLIC BLOOD PRESSURE: 116 MMHG | TEMPERATURE: 98 F | HEIGHT: 64 IN | BODY MASS INDEX: 20.08 KG/M2 | RESPIRATION RATE: 12 BRPM

## 2023-06-20 DIAGNOSIS — Z11.1 TUBERCULOSIS SCREENING: ICD-10-CM

## 2023-06-20 DIAGNOSIS — M54.9 UPPER BACK PAIN: ICD-10-CM

## 2023-06-20 DIAGNOSIS — R92.2 DENSE BREASTS: ICD-10-CM

## 2023-06-20 DIAGNOSIS — Z12.31 ENCOUNTER FOR SCREENING MAMMOGRAM FOR MALIGNANT NEOPLASM OF BREAST: ICD-10-CM

## 2023-06-20 DIAGNOSIS — Z00.00 PHYSICAL EXAM, ANNUAL: Primary | ICD-10-CM

## 2023-06-20 DIAGNOSIS — R32 URINARY INCONTINENCE, UNSPECIFIED TYPE: ICD-10-CM

## 2023-06-20 PROCEDURE — 86580 TB INTRADERMAL TEST: CPT | Performed by: INTERNAL MEDICINE

## 2023-06-20 PROCEDURE — 3008F BODY MASS INDEX DOCD: CPT | Performed by: INTERNAL MEDICINE

## 2023-06-20 PROCEDURE — 3078F DIAST BP <80 MM HG: CPT | Performed by: INTERNAL MEDICINE

## 2023-06-20 PROCEDURE — 99396 PREV VISIT EST AGE 40-64: CPT | Performed by: INTERNAL MEDICINE

## 2023-06-20 PROCEDURE — 3074F SYST BP LT 130 MM HG: CPT | Performed by: INTERNAL MEDICINE

## 2023-06-20 PROCEDURE — 99213 OFFICE O/P EST LOW 20 MIN: CPT | Performed by: INTERNAL MEDICINE

## 2023-06-23 ENCOUNTER — NURSE ONLY (OUTPATIENT)
Dept: INTERNAL MEDICINE CLINIC | Facility: CLINIC | Age: 63
End: 2023-06-23
Payer: COMMERCIAL

## 2023-06-23 ENCOUNTER — LAB ENCOUNTER (OUTPATIENT)
Dept: LAB | Age: 63
End: 2023-06-23
Attending: INTERNAL MEDICINE
Payer: COMMERCIAL

## 2023-06-23 DIAGNOSIS — Z00.00 PHYSICAL EXAM, ANNUAL: ICD-10-CM

## 2023-06-23 LAB
ALBUMIN SERPL-MCNC: 3.9 G/DL (ref 3.4–5)
ALBUMIN/GLOB SERPL: 1.3 {RATIO} (ref 1–2)
ALP LIVER SERPL-CCNC: 56 U/L
ALT SERPL-CCNC: 23 U/L
ANION GAP SERPL CALC-SCNC: 1 MMOL/L (ref 0–18)
AST SERPL-CCNC: 21 U/L (ref 15–37)
BASOPHILS # BLD AUTO: 0.03 X10(3) UL (ref 0–0.2)
BASOPHILS NFR BLD AUTO: 0.8 %
BILIRUB SERPL-MCNC: 0.3 MG/DL (ref 0.1–2)
BUN BLD-MCNC: 14 MG/DL (ref 7–18)
CALCIUM BLD-MCNC: 8.8 MG/DL (ref 8.5–10.1)
CHLORIDE SERPL-SCNC: 112 MMOL/L (ref 98–112)
CHOLEST SERPL-MCNC: 192 MG/DL (ref ?–200)
CO2 SERPL-SCNC: 27 MMOL/L (ref 21–32)
CREAT BLD-MCNC: 0.72 MG/DL
EOSINOPHIL # BLD AUTO: 0.08 X10(3) UL (ref 0–0.7)
EOSINOPHIL NFR BLD AUTO: 2.2 %
ERYTHROCYTE [DISTWIDTH] IN BLOOD BY AUTOMATED COUNT: 11.7 %
EST. AVERAGE GLUCOSE BLD GHB EST-MCNC: 108 MG/DL (ref 68–126)
FASTING PATIENT LIPID ANSWER: YES
FASTING STATUS PATIENT QL REPORTED: YES
GFR SERPLBLD BASED ON 1.73 SQ M-ARVRAT: 94 ML/MIN/1.73M2 (ref 60–?)
GLOBULIN PLAS-MCNC: 3.1 G/DL (ref 2.8–4.4)
GLUCOSE BLD-MCNC: 84 MG/DL (ref 70–99)
HBA1C MFR BLD: 5.4 % (ref ?–5.7)
HCT VFR BLD AUTO: 39.4 %
HDLC SERPL-MCNC: 77 MG/DL (ref 40–59)
HGB BLD-MCNC: 13.4 G/DL
IMM GRANULOCYTES # BLD AUTO: 0.01 X10(3) UL (ref 0–1)
IMM GRANULOCYTES NFR BLD: 0.3 %
INDURATION (): 0 MM (ref 0–11)
LDLC SERPL CALC-MCNC: 103 MG/DL (ref ?–100)
LYMPHOCYTES # BLD AUTO: 1.36 X10(3) UL (ref 1–4)
LYMPHOCYTES NFR BLD AUTO: 37.1 %
MCH RBC QN AUTO: 32.5 PG (ref 26–34)
MCHC RBC AUTO-ENTMCNC: 34 G/DL (ref 31–37)
MCV RBC AUTO: 95.6 FL
MONOCYTES # BLD AUTO: 0.33 X10(3) UL (ref 0.1–1)
MONOCYTES NFR BLD AUTO: 9 %
NEUTROPHILS # BLD AUTO: 1.86 X10 (3) UL (ref 1.5–7.7)
NEUTROPHILS # BLD AUTO: 1.86 X10(3) UL (ref 1.5–7.7)
NEUTROPHILS NFR BLD AUTO: 50.6 %
NONHDLC SERPL-MCNC: 115 MG/DL (ref ?–130)
OSMOLALITY SERPL CALC.SUM OF ELEC: 290 MOSM/KG (ref 275–295)
PLATELET # BLD AUTO: 243 10(3)UL (ref 150–450)
POTASSIUM SERPL-SCNC: 4.4 MMOL/L (ref 3.5–5.1)
PROT SERPL-MCNC: 7 G/DL (ref 6.4–8.2)
RBC # BLD AUTO: 4.12 X10(6)UL
SODIUM SERPL-SCNC: 140 MMOL/L (ref 136–145)
TRIGL SERPL-MCNC: 64 MG/DL (ref 30–149)
TSI SER-ACNC: 0.81 MIU/ML (ref 0.36–3.74)
VIT D+METAB SERPL-MCNC: 29.3 NG/ML (ref 30–100)
VLDLC SERPL CALC-MCNC: 11 MG/DL (ref 0–30)
WBC # BLD AUTO: 3.7 X10(3) UL (ref 4–11)

## 2023-06-23 PROCEDURE — 84443 ASSAY THYROID STIM HORMONE: CPT

## 2023-06-23 PROCEDURE — 80061 LIPID PANEL: CPT

## 2023-06-23 PROCEDURE — 36415 COLL VENOUS BLD VENIPUNCTURE: CPT

## 2023-06-23 PROCEDURE — 83036 HEMOGLOBIN GLYCOSYLATED A1C: CPT

## 2023-06-23 PROCEDURE — 80053 COMPREHEN METABOLIC PANEL: CPT

## 2023-06-23 PROCEDURE — 85025 COMPLETE CBC W/AUTO DIFF WBC: CPT

## 2023-06-23 PROCEDURE — 82306 VITAMIN D 25 HYDROXY: CPT

## 2023-06-27 ENCOUNTER — HOSPITAL ENCOUNTER (OUTPATIENT)
Dept: MAMMOGRAPHY | Age: 63
Discharge: HOME | End: 2023-06-27
Attending: INTERNAL MEDICINE
Payer: COMMERCIAL

## 2023-06-27 DIAGNOSIS — Z12.31 ENCOUNTER FOR SCREENING MAMMOGRAM FOR MALIGNANT NEOPLASM OF BREAST: ICD-10-CM

## 2023-06-27 PROCEDURE — 77067 SCR MAMMO BI INCL CAD: CPT | Performed by: INTERNAL MEDICINE

## 2023-06-27 PROCEDURE — 77063 BREAST TOMOSYNTHESIS BI: CPT | Performed by: INTERNAL MEDICINE

## 2023-07-13 ENCOUNTER — TELEPHONE (OUTPATIENT)
Dept: PHYSICAL THERAPY | Facility: HOSPITAL | Age: 63
End: 2023-07-13

## 2023-07-17 ENCOUNTER — OFFICE VISIT (OUTPATIENT)
Dept: PHYSICAL THERAPY | Age: 63
End: 2023-07-17
Attending: INTERNAL MEDICINE
Payer: COMMERCIAL

## 2023-07-17 ENCOUNTER — HOSPITAL ENCOUNTER (OUTPATIENT)
Dept: MAMMOGRAPHY | Facility: HOSPITAL | Age: 63
Discharge: HOME | End: 2023-07-17
Attending: INTERNAL MEDICINE
Payer: COMMERCIAL

## 2023-07-17 DIAGNOSIS — R92.2 INCONCLUSIVE MAMMOGRAM: ICD-10-CM

## 2023-07-17 DIAGNOSIS — M54.9 UPPER BACK PAIN: Primary | ICD-10-CM

## 2023-07-17 PROCEDURE — 76642 ULTRASOUND BREAST LIMITED: CPT | Performed by: INTERNAL MEDICINE

## 2023-07-17 PROCEDURE — 77061 BREAST TOMOSYNTHESIS UNI: CPT | Performed by: INTERNAL MEDICINE

## 2023-07-17 PROCEDURE — 97161 PT EVAL LOW COMPLEX 20 MIN: CPT

## 2023-07-17 PROCEDURE — 77065 DX MAMMO INCL CAD UNI: CPT | Performed by: INTERNAL MEDICINE

## 2023-07-17 PROCEDURE — 97110 THERAPEUTIC EXERCISES: CPT

## 2023-07-18 ENCOUNTER — TELEPHONE (OUTPATIENT)
Dept: PHYSICAL THERAPY | Facility: HOSPITAL | Age: 63
End: 2023-07-18

## 2023-07-19 ENCOUNTER — OFFICE VISIT (OUTPATIENT)
Dept: INTERNAL MEDICINE CLINIC | Facility: CLINIC | Age: 63
End: 2023-07-19
Payer: COMMERCIAL

## 2023-07-19 VITALS
TEMPERATURE: 97 F | BODY MASS INDEX: 20.28 KG/M2 | WEIGHT: 118.81 LBS | SYSTOLIC BLOOD PRESSURE: 104 MMHG | DIASTOLIC BLOOD PRESSURE: 68 MMHG | HEART RATE: 60 BPM | HEIGHT: 64 IN | RESPIRATION RATE: 14 BRPM | OXYGEN SATURATION: 98 %

## 2023-07-19 DIAGNOSIS — N30.00 ACUTE CYSTITIS WITHOUT HEMATURIA: Primary | ICD-10-CM

## 2023-07-19 LAB
APPEARANCE: CLEAR
BILIRUBIN: NEGATIVE
GLUCOSE (URINE DIPSTICK): NEGATIVE MG/DL
KETONES (URINE DIPSTICK): NEGATIVE MG/DL
MULTISTIX LOT#: ABNORMAL NUMERIC
NITRITE, URINE: NEGATIVE
PH, URINE: 6 (ref 4.5–8)
PROTEIN (URINE DIPSTICK): NEGATIVE MG/DL
SPECIFIC GRAVITY: <=1.005 (ref 1–1.03)
URINE-COLOR: YELLOW
UROBILINOGEN,SEMI-QN: 0.2 MG/DL (ref 0–1.9)

## 2023-07-19 PROCEDURE — 3078F DIAST BP <80 MM HG: CPT | Performed by: NURSE PRACTITIONER

## 2023-07-19 PROCEDURE — 87086 URINE CULTURE/COLONY COUNT: CPT | Performed by: NURSE PRACTITIONER

## 2023-07-19 PROCEDURE — 3074F SYST BP LT 130 MM HG: CPT | Performed by: NURSE PRACTITIONER

## 2023-07-19 PROCEDURE — 87186 SC STD MICRODIL/AGAR DIL: CPT | Performed by: NURSE PRACTITIONER

## 2023-07-19 PROCEDURE — 99213 OFFICE O/P EST LOW 20 MIN: CPT | Performed by: NURSE PRACTITIONER

## 2023-07-19 PROCEDURE — 87077 CULTURE AEROBIC IDENTIFY: CPT | Performed by: NURSE PRACTITIONER

## 2023-07-19 PROCEDURE — 3008F BODY MASS INDEX DOCD: CPT | Performed by: NURSE PRACTITIONER

## 2023-07-19 PROCEDURE — 81003 URINALYSIS AUTO W/O SCOPE: CPT | Performed by: NURSE PRACTITIONER

## 2023-07-19 RX ORDER — NITROFURANTOIN 25; 75 MG/1; MG/1
100 CAPSULE ORAL 2 TIMES DAILY
Qty: 14 CAPSULE | Refills: 0 | Status: SHIPPED | OUTPATIENT
Start: 2023-07-19 | End: 2023-07-26

## 2023-07-19 NOTE — PATIENT INSTRUCTIONS
Start the antibiotic. Finish the full course even if you start feeling better. Take antibiotic completely as ordered. Take antibiotic with food. Eat yogurt twice a day while on antibiotic or take an oral probiotic. Monitor for diarrhea, side effects, allergy. You can take over the counter AZO x 1-2 days as needed for the burning with urination. Drink at least 64-80 oz of water. Always wipe from front to back. Avoid any sexual intercourse until after finishing antibiotics. Go to ER or call 911 if symptoms develop such as blood in the urine, persistent fever >103, severe abdominal pain. Follow up in 1 week as needed if symptoms are not resolved/worsening, otherwise follow-up when routine care is due or as needed.

## 2023-07-20 ENCOUNTER — OFFICE VISIT (OUTPATIENT)
Dept: PHYSICAL THERAPY | Age: 63
End: 2023-07-20
Attending: INTERNAL MEDICINE
Payer: COMMERCIAL

## 2023-07-20 ENCOUNTER — APPOINTMENT (OUTPATIENT)
Dept: PHYSICAL THERAPY | Age: 63
End: 2023-07-20
Attending: INTERNAL MEDICINE
Payer: COMMERCIAL

## 2023-07-20 PROCEDURE — 97110 THERAPEUTIC EXERCISES: CPT

## 2023-07-20 PROCEDURE — 97140 MANUAL THERAPY 1/> REGIONS: CPT

## 2023-07-20 NOTE — PROGRESS NOTES
Referring Diagnosis: Upper back pain (M54.9)    Referring Provider: Emilee Montenegro Date of Evaluation: 7/17/2023   Precautions: Maddie Gardiner MD visit: none scheduled   Date of Surgery: n/a    Insurance Primary/Secondary: [employer] [place] P[place] / N/A     # Auth Visits: 8 POC     Subjective: Doing well with the exercises, seems to help with other abdominal exercises. Symptoms are still present. Current Pain: 5/10    Objective:   Cervical AROM:  Right Lateral Flexion: 20 (25 post treatment)  Left Lateral Flexion: 30    Accessory Motion:  1st Rib: R WNL; L hypomobile inferior glide    Assessment: Improved control with deep neck flexor endurance, modified HEP to cervical retraction with head lift. Mildly improved cervical lateral flexion ROM, but no change to symptoms. Goals:   Pt will improve deep neck flexor endurance to >15 sec (5 visits)  Pt will improve right cervical lateral flexion to >/=30 deg, pain free (8 visits)  Pt will be able to carry >5# at side without pain (8 visits)  Pt will be able to look over shoulder to clear blind spot while driving pain free (8 visits)    Plan: Continue PT. Date:   7/20/2023  TX#: 2 Date:  TX#: 3 Date:  TX#: 4 Date:  TX#: 5 Date:  TX#: 6 Date:  TX#: 7 Date:  TX#: 8   TherEx:  -Cervical retraction, supine, 10x10 sec hold  -DNFE, 15x10 sec hold  -Cervical retraction with bird-dog x20 reps  -Reverse fly, green band, 2x10 reps         Manual:  -Left 1st rib inferior glide gr III x10 min           HEP: DNFE    Charges:  TherEx x2 (30 min), Manual x1 (10 min)       Total Timed Treatment: 40 min  Total Treatment Time: 40 min

## 2023-07-24 ENCOUNTER — APPOINTMENT (OUTPATIENT)
Dept: PHYSICAL THERAPY | Age: 63
End: 2023-07-24
Attending: INTERNAL MEDICINE
Payer: COMMERCIAL

## 2023-07-26 ENCOUNTER — OFFICE VISIT (OUTPATIENT)
Dept: PHYSICAL THERAPY | Age: 63
End: 2023-07-26
Attending: INTERNAL MEDICINE
Payer: COMMERCIAL

## 2023-07-26 PROCEDURE — 97140 MANUAL THERAPY 1/> REGIONS: CPT

## 2023-07-26 PROCEDURE — 97110 THERAPEUTIC EXERCISES: CPT

## 2023-07-26 NOTE — PROGRESS NOTES
Referring Diagnosis: Upper back pain (M54.9)    Referring Provider: No ref. provider found Date of Evaluation: 7/17/2023   Precautions: Malinda Body Next MD visit: none scheduled   Date of Surgery: n/a    Insurance Primary/Secondary: [place] / N/A     # Auth Visits: 8 POC     Subjective: Exercises are going well. Woke up more stiff/sore this AM, not certain any source. Current Pain: 5/10    Objective:   Cervical AROM:  Right Lateral Flexion: 15 (30 post treatment)  Left Lateral Flexion: 30    Accessory Motion:  1st Rib: R WNL; L hypomobile inferior glide    Assessment: Reduced pain following cervical accessory mobilization. Added cervical lateral flexion with manual self assist to HEP. Goals:   Pt will improve deep neck flexor endurance to >15 sec (5 visits)  Pt will improve right cervical lateral flexion to >/=30 deg, pain free (8 visits)  Pt will be able to carry >5# at side without pain (8 visits)  Pt will be able to look over shoulder to clear blind spot while driving pain free (8 visits)    Plan: Continue PT. Date:   7/20/2023  TX#: 2 Date:  7/26/2023  TX#: 3 Date:  TX#: 4 Date:  TX#: 5 Date:  TX#: 6 Date:  TX#: 7 Date:  TX#: 8   TherEx:  -Cervical retraction, supine, 10x10 sec hold  -DNFE, 15x10 sec hold  -Cervical retraction with bird-dog x20 reps  -Reverse fly, green band, 2x10 reps TherEx:  -DNFE, 15x10 sec hold  -Cervical retraction with bird-dog x20 reps  -Reverse fly, green band, 2x10 reps  Cervical lateral flexion R with self OP x20 reps        Manual:  -Left 1st rib inferior glide gr III x10 min Manual:  -C6/7 L UPA gr II x10 min          HEP: DNFE    Charges:  TherEx x2 (30 min), Manual x1 (10 min)       Total Timed Treatment: 40 min  Total Treatment Time: 40 min

## 2023-07-28 ENCOUNTER — APPOINTMENT (OUTPATIENT)
Dept: PHYSICAL THERAPY | Age: 63
End: 2023-07-28
Attending: INTERNAL MEDICINE
Payer: COMMERCIAL

## 2023-07-31 ENCOUNTER — OFFICE VISIT (OUTPATIENT)
Dept: PHYSICAL THERAPY | Age: 63
End: 2023-07-31
Attending: INTERNAL MEDICINE
Payer: COMMERCIAL

## 2023-07-31 ENCOUNTER — HOSPITAL ENCOUNTER (OUTPATIENT)
Age: 63
Discharge: HOME | End: 2023-07-31
Payer: COMMERCIAL

## 2023-07-31 ENCOUNTER — APPOINTMENT (OUTPATIENT)
Dept: GENERAL RADIOLOGY | Age: 63
End: 2023-07-31
Attending: NURSE PRACTITIONER
Payer: COMMERCIAL

## 2023-07-31 VITALS
OXYGEN SATURATION: 97 % | TEMPERATURE: 98 F | SYSTOLIC BLOOD PRESSURE: 159 MMHG | HEART RATE: 100 BPM | RESPIRATION RATE: 18 BRPM | DIASTOLIC BLOOD PRESSURE: 82 MMHG

## 2023-07-31 DIAGNOSIS — S90.211A SUBUNGUAL HEMATOMA OF GREAT TOE OF RIGHT FOOT, INITIAL ENCOUNTER: ICD-10-CM

## 2023-07-31 DIAGNOSIS — S90.111A CONTUSION OF RIGHT GREAT TOE WITHOUT DAMAGE TO NAIL, INITIAL ENCOUNTER: Primary | ICD-10-CM

## 2023-07-31 PROCEDURE — 99213 OFFICE O/P EST LOW 20 MIN: CPT | Performed by: NURSE PRACTITIONER

## 2023-07-31 PROCEDURE — 97140 MANUAL THERAPY 1/> REGIONS: CPT

## 2023-07-31 PROCEDURE — 73660 X-RAY EXAM OF TOE(S): CPT | Performed by: NURSE PRACTITIONER

## 2023-07-31 PROCEDURE — 97110 THERAPEUTIC EXERCISES: CPT

## 2023-07-31 NOTE — PROGRESS NOTES
Referring Diagnosis: Upper back pain (M54.9)    Referring Provider: Ophelia Rosa Date of Evaluation: 7/17/2023   Precautions: Kalpesh Gardiner MD visit: none scheduled   Date of Surgery: n/a    Insurance Primary/Secondary: [employer] / N/A     # Auth Visits: 8 POC     Subjective: Neck is feeling better, not having the tightness/pain in the neck. States she caught her toe under door on Friday, having increased pain. Current Pain: 0/10    Objective:   Cervical AROM:  Right Rotation: 65  Left Rotation: 70  Right Lateral Flexion: 25 (30 post treatment)  Left Lateral Flexion: 30    Special tests:   Deep Neck Flexor Endurance: 20 sec    Toe Pain: Right great toe bruised; limited toe flexion/extension AROM (pain)    Assessment: Improved DNFE test. Improved mobility with cervical accessory mobilization. Added towel assisted cervical rotation for improved mobility. Discussed consulting with PCP/urgent care to assess toe. Goals:   Pt will improve deep neck flexor endurance to >15 sec (5 visits)  Pt will improve right cervical lateral flexion to >/=30 deg, pain free (8 visits)  Pt will be able to carry >5# at side without pain (8 visits)  Pt will be able to look over shoulder to clear blind spot while driving pain free (8 visits)    Plan: Continue PT.      Date:   7/20/2023  TX#: 2 Date:  7/26/2023  TX#: 3 Date:  7/31/2023  TX#: 4 Date:  TX#: 5 Date:  TX#: 6 Date:  TX#: 7 Date:  TX#: 8   TherEx:  -Cervical retraction, supine, 10x10 sec hold  -DNFE, 15x10 sec hold  -Cervical retraction with bird-dog x20 reps  -Reverse fly, green band, 2x10 reps TherEx:  -DNFE, 15x10 sec hold  -Cervical retraction with bird-dog x20 reps  -Reverse fly, green band, 2x10 reps  Cervical lateral flexion R with self OP x20 reps TherEx:  -DNFE, 15x10 sec hold  -Cervical retraction with bird-dog x20 reps  -Reverse fly, green band, 2x10 reps  -Cervical lateral flexion R with self OP x20 reps  -Towel assisted cervical rotation R/L x20 reps       Manual:  -Left 1st rib inferior glide gr III x10 min Manual:  -C6/7 L UPA gr II x10 min Manual:  -C6/7 L UPA gr II x10 min         HEP: DNFE, cervical R lateral flexion with self mobilization, towel assisted cervical rotation     Charges:  TherEx x2 (30 min), Manual x1 (10 min)       Total Timed Treatment: 40 min  Total Treatment Time: 40 min

## 2023-07-31 NOTE — ED INITIAL ASSESSMENT (HPI)
Patient reports on Friday, she opened a door and her left big toe seemed to go partially underneath the door. Redness and bruising and swelling observed to left big toe. Bruising/bleeding observed underneath toe nail. Difficulty with weightbearing on toe d/t pain and tenderness. States she was wearing gym shoe when this happened.

## 2023-07-31 NOTE — DISCHARGE INSTRUCTIONS
RICE:     Rest and elevate the affected extremity. Apply ice 4 times daily with a cloth barrier to reduce swelling. Keep the wound clean and dry. You may take ibuprofen 600mg every 6 hours as needed for pain. You may also take Tylenol 1000mg every 6 hours as needed for pain. Follow-up with your primary care physician in 2-3 days as needed. Return to the emergency room if you develop new or worsening symptoms.

## 2023-08-02 ENCOUNTER — APPOINTMENT (OUTPATIENT)
Dept: PHYSICAL THERAPY | Age: 63
End: 2023-08-02
Attending: INTERNAL MEDICINE
Payer: COMMERCIAL

## 2023-08-03 ENCOUNTER — TELEPHONE (OUTPATIENT)
Dept: PODIATRY CLINIC | Facility: CLINIC | Age: 63
End: 2023-08-03

## 2023-08-03 NOTE — TELEPHONE ENCOUNTER
Can we please offer UC follow up with any provider in our group in the next 1-2 weeks? Preferably no OB but okay if needed. Thanks.

## 2023-08-07 ENCOUNTER — OFFICE VISIT (OUTPATIENT)
Dept: PHYSICAL THERAPY | Age: 63
End: 2023-08-07
Attending: INTERNAL MEDICINE
Payer: COMMERCIAL

## 2023-08-07 PROCEDURE — 97110 THERAPEUTIC EXERCISES: CPT

## 2023-08-07 PROCEDURE — 97140 MANUAL THERAPY 1/> REGIONS: CPT

## 2023-08-07 NOTE — PROGRESS NOTES
Referring Diagnosis: Upper back pain (M54.9)    Referring Provider: Lindsay Burgess Date of Evaluation: 7/17/2023   Precautions: Salty Gardiner MD visit: none scheduled   Date of Surgery: n/a    Insurance Primary/Secondary: [place] / N/A     # Auth Visits: 8 POC     Subjective: Not having symptoms as frequently, improving. Exercises at home are going well. Current Pain: 0/10    Objective:   Cervical AROM:  Right Rotation: 70  Left Rotation: 70  Right Lateral Flexion: 28 (30 post treatment)  Left Lateral Flexion: 30    Special tests:   Deep Neck Flexor Endurance: 20 sec    Assessment: Improved cervical mobility compared to last session. Good response to manual intervention to improve mobility. Instructed to continue HEP for mobility and strength. Goals:   Pt will improve deep neck flexor endurance to >15 sec (5 visits) Met  Pt will improve right cervical lateral flexion to >/=30 deg, pain free (8 visits)  Pt will be able to carry >5# at side without pain (8 visits)  Pt will be able to look over shoulder to clear blind spot while driving pain free (8 visits)    Plan: Continue PT.      Date:   7/20/2023  TX#: 2 Date:  7/26/2023  TX#: 3 Date:  7/31/2023  TX#: 4 Date:  8/7/2023  TX#: 5 Date:  TX#: 6 Date:  TX#: 7 Date:  TX#: 8   TherEx:  -Cervical retraction, supine, 10x10 sec hold  -DNFE, 15x10 sec hold  -Cervical retraction with bird-dog x20 reps  -Reverse fly, green band, 2x10 reps TherEx:  -DNFE, 15x10 sec hold  -Cervical retraction with bird-dog x20 reps  -Reverse fly, green band, 2x10 reps  Cervical lateral flexion R with self OP x20 reps TherEx:  -DNFE, 15x10 sec hold  -Cervical retraction with bird-dog x20 reps  -Reverse fly, green band, 2x10 reps  -Cervical lateral flexion R with self OP x20 reps  -Towel assisted cervical rotation R/L x20 reps TherEx:  -DNFE, 15x10 sec hold  -Cervical retraction with bird-dog x20 reps  -Reverse fly, green band, 2x10 reps  -Cervical lateral flexion R with self OP x20 reps  -Towel assisted cervical rotation R/L x20 reps      Manual:  -Left 1st rib inferior glide gr III x10 min Manual:  -C6/7 L UPA gr II x10 min Manual:  -C6/7 L UPA gr II x10 min Manual:  -C6/7 L UPA gr II x10 min        HEP: DNFE, cervical R lateral flexion with self mobilization, towel assisted cervical rotation     Charges:  TherEx x2 (30 min), Manual x1 (10 min)       Total Timed Treatment: 40 min  Total Treatment Time: 40 min

## 2023-08-09 ENCOUNTER — APPOINTMENT (OUTPATIENT)
Dept: PHYSICAL THERAPY | Age: 63
End: 2023-08-09
Attending: INTERNAL MEDICINE
Payer: COMMERCIAL

## 2023-08-10 ENCOUNTER — OFFICE VISIT (OUTPATIENT)
Facility: CLINIC | Age: 63
End: 2023-08-10
Payer: COMMERCIAL

## 2023-08-10 VITALS — HEART RATE: 78 BPM | SYSTOLIC BLOOD PRESSURE: 126 MMHG | DIASTOLIC BLOOD PRESSURE: 72 MMHG | OXYGEN SATURATION: 100 %

## 2023-08-10 DIAGNOSIS — M81.0 AGE-RELATED OSTEOPOROSIS WITHOUT CURRENT PATHOLOGICAL FRACTURE: Primary | ICD-10-CM

## 2023-08-10 DIAGNOSIS — R79.89 LOW VITAMIN D LEVEL: ICD-10-CM

## 2023-08-10 PROCEDURE — 3078F DIAST BP <80 MM HG: CPT | Performed by: STUDENT IN AN ORGANIZED HEALTH CARE EDUCATION/TRAINING PROGRAM

## 2023-08-10 PROCEDURE — 99214 OFFICE O/P EST MOD 30 MIN: CPT | Performed by: STUDENT IN AN ORGANIZED HEALTH CARE EDUCATION/TRAINING PROGRAM

## 2023-08-10 PROCEDURE — 3074F SYST BP LT 130 MM HG: CPT | Performed by: STUDENT IN AN ORGANIZED HEALTH CARE EDUCATION/TRAINING PROGRAM

## 2023-08-10 RX ORDER — ALENDRONATE SODIUM 70 MG/1
70 TABLET ORAL
Qty: 12 TABLET | Refills: 3 | Status: SHIPPED | OUTPATIENT
Start: 2023-08-10

## 2023-08-10 NOTE — PROGRESS NOTES
Endocrinology Clinic Note    Name: Vasiliy Quintana    Date: 8/10/2023      HISTORY OF PRESENT ILLNESS   Vasiliy Quintana is a 59year old female who presents for f/u for osteoporosis. Initial HPI in Aug 2022  Osteoporosis hx:  Diagnosed initially with DXA 2020 with L spine T -2.8, hip -1.5, fem neck -2.4  Repeat DXA 2022 slightly worse with L spine T -2.9, hip -1.6, fem neck -2.5  Pt has hx of vit D deficiency so has been working on repleting that. Review of labs 2022:  TSH 1.54  Alk phos 86  Ca 9.4  GFR 89  Vit D 31.7 (3000IU, not daily)    Pt endorses Fhx of osteoporosis, Hx of vitamin D deficiency and Regular exercise  Pt denies Hx of fragility fx, Hx of kidney stones, Hx of chronic steroids >3 months, Hx of treatment for osteoporosis, Hx of hyperTH, Hx of hyperPTH, Hx of hypogonadism, Heavy EtOH use and Hx of smoking/Current smoker    She is very physically active, pays attention to diet, big walker  Amenable to starting Fosamax    Interim hx:  Aug 2023  Tolerating fosamax every Sunday, only missed 3 doses all year  6/2023 - vit D 29.3, Ca and EGFR at goal, TSH 0.814 -- taking vit D 3000IU daily  No fractures; Continues to exercise  Denies any malabsorption, steatorrhea, gluten sensitivity. Rest of comprehensive labs by PCP all wnl     PAST MEDICAL HISTORY:   Past Medical History:   Diagnosis Date    Age-related osteoporosis without current pathological fracture 1/9/2020    Food intolerance     Stress incontinence 3/2/2015       PAST SURGICAL HISTORY:   Past Surgical History:   Procedure Laterality Date    BIOPSY OF BREAST, NEEDLE CORE  at 22 yrs old    COLONOSCOPY      COLONOSCOPY & POLYPECTOMY  8/11/15    repeat in 5 years    NEEDLE BIOPSY LEFT      1984 bn       CURRENT MEDICATIONS:    Current Outpatient Medications   Medication Sig Dispense Refill    cholecalciferol 50 MCG (2000 UT) Oral Tab Take 2 tablets (4,000 Units total) by mouth daily. 30 tablet 0    TURMERIC OR Take by mouth 2 (two) times a day. MAGNESIUM GLYCINATE OR Take 350 mg by mouth in the morning and 350 mg before bedtime. alendronate (FOSAMAX) 70 MG Oral Tab Take 1 tablet (70 mg total) by mouth every 7 days. 12 tablet 3    Cholecalciferol (VITAMIN D3) 75 MCG (3000 UT) Oral Tab Take 1 tablet by mouth daily. 90 tablet 1    Fluocinonide 0.05 % External Ointment Apply 1 Dose topically as needed. 2     Endocrine Medications            alendronate (FOSAMAX) 70 MG Oral Tab            ALLERGIES:  No Known Allergies    SOCIAL HISTORY:    Social History    Socioeconomic History      Marital status:     Tobacco Use      Smoking status: Never      Smokeless tobacco: Never    Vaping Use      Vaping Use: Never used    Substance and Sexual Activity      Alcohol use: Yes        Comment: approx 0-2 drinks per week      Drug use: No    Other Topics      Concerns:        Caffeine Concern: Yes        Stress Concern: No        Weight Concern: No        Special Diet: No        Exercise: Yes        Seat Belt: Yes      FAMILY HISTORY:   Family History   Problem Relation Age of Onset    Heart Attack Father 72        smoker    Heart Disorder Father     Dementia Mother     Other (Alzheimer's Disease) Mother     Cancer Paternal Grandmother     Lung Disorder Paternal Grandfather         COPD    Other (RA) Sister 59    Other (colon polyps) Sister     Cancer Brother         Melanoma    Heart Disorder Brother 72        AF, AV replacement, and CABG x 2         REVIEW OF SYSTEMS:  Ten point review of systems has been performed and is otherwise negative and/or non-contributory, except as described above. PHYSICAL EXAM:    08/10/23  1511   BP: 126/72   Pulse: 78   SpO2: 100%     BMI: There is no height or weight on file to calculate BMI.      CONSTITUTIONAL:  awake, alert, cooperative, no apparent distress, and appears stated age  PSYCH: normal affect  LUNGS: breathing comfortably  CARDIOVASCULAR:  regular rate     DATA:         ASSESSMENT AND PLAN:    (M81.0) Age-related osteoporosis without current pathological fracture  (primary encounter diagnosis)  Plan: no secondary etiology identified; likely age- related and +FHx  Pt started on fosamax in Aug 2022, tolerating well. Vit D a little low for daily vit D 3000IU intake, clinical history not c/w malabsorption. Will trial to incr to 4000IU.  If not responding appropriately, will do further w/u  - DXA now if insurance allows, otherwise in 1 more year  - continue fosamax 70mg weekly  - continue vit D and Ca supplementation  - continue weight bearing exercise  - Verbalized understanding of risks and benefits       (R79.89) Low vitamin D level  Plan: currently vit D at low end of normal   - incr to 4000IU with repeat lab in 3 months    Return to Clinic in 1 year        8/10/2023  damián Paez MD

## 2023-09-03 DIAGNOSIS — M81.0 AGE-RELATED OSTEOPOROSIS WITHOUT CURRENT PATHOLOGICAL FRACTURE: ICD-10-CM

## 2023-09-05 RX ORDER — ALENDRONATE SODIUM 70 MG/1
70 TABLET ORAL
Qty: 12 TABLET | Refills: 3 | OUTPATIENT
Start: 2023-09-05

## 2023-09-05 NOTE — TELEPHONE ENCOUNTER
LOV: 8/10/23    RTC: 1 year    FU: not currently scheduled     Last Refill: 8/10/23- 1 year supply    Refill denied- too soon, 1 year refill just sent 8/10/23

## 2023-09-13 ENCOUNTER — APPOINTMENT (OUTPATIENT)
Dept: PHYSICAL THERAPY | Age: 63
End: 2023-09-13
Attending: INTERNAL MEDICINE
Payer: COMMERCIAL

## 2023-09-20 ENCOUNTER — APPOINTMENT (OUTPATIENT)
Dept: PHYSICAL THERAPY | Age: 63
End: 2023-09-20
Attending: INTERNAL MEDICINE
Payer: COMMERCIAL

## 2023-09-27 ENCOUNTER — APPOINTMENT (OUTPATIENT)
Dept: PHYSICAL THERAPY | Age: 63
End: 2023-09-27
Attending: INTERNAL MEDICINE
Payer: COMMERCIAL

## 2024-06-06 ENCOUNTER — TELEPHONE (OUTPATIENT)
Facility: CLINIC | Age: 64
End: 2024-06-06

## 2024-06-06 NOTE — TELEPHONE ENCOUNTER
LOV: 8/10/23    RTC: 1 year     FU: none scheduled     Last Refill: 8/10/23-1 year     Phoned patient to discuss if she is still planning on following up with our office? If so, will need to schedule with new provider as Dr. Rich on leave.    No answer, LMTCB per ADAM.

## 2024-06-06 NOTE — TELEPHONE ENCOUNTER
6/6/24-Received via fax from [facility] a refill request for alendronate 70mg tablet.  Placed in PA in basket.

## 2024-06-12 NOTE — TELEPHONE ENCOUNTER
RN phoned patient to no answer, LVM for call back    For refill of Alendronate- need to get patient scheduled with on of the [facility] End[facility]'s    A year follow up would fall in August.

## 2024-06-20 ENCOUNTER — HOSPITAL ENCOUNTER (OUTPATIENT)
Dept: BONE DENSITY | Age: 64
Discharge: HOME | End: 2024-06-20
Attending: STUDENT IN AN ORGANIZED HEALTH CARE EDUCATION/TRAINING PROGRAM

## 2024-06-20 DIAGNOSIS — M81.0 AGE-RELATED OSTEOPOROSIS WITHOUT CURRENT PATHOLOGICAL FRACTURE: ICD-10-CM

## 2024-06-20 PROCEDURE — 77080 DXA BONE DENSITY AXIAL: CPT | Performed by: STUDENT IN AN ORGANIZED HEALTH CARE EDUCATION/TRAINING PROGRAM

## 2024-06-20 NOTE — PROGRESS NOTES
Pt may schedule with anyone for follow up in August (Dr. Rich had wanted 1 year follow up in August 2024).

## 2024-07-31 ENCOUNTER — TELEPHONE (OUTPATIENT)
Dept: INTERNAL MEDICINE CLINIC | Facility: CLINIC | Age: 64
End: 2024-07-31

## 2024-07-31 DIAGNOSIS — Z13.228 SCREENING FOR ENDOCRINE, METABOLIC AND IMMUNITY DISORDER: ICD-10-CM

## 2024-07-31 DIAGNOSIS — Z13.29 SCREENING FOR THYROID DISORDER: ICD-10-CM

## 2024-07-31 DIAGNOSIS — Z13.29 SCREENING FOR ENDOCRINE, METABOLIC AND IMMUNITY DISORDER: ICD-10-CM

## 2024-07-31 DIAGNOSIS — E55.9 VITAMIN D DEFICIENCY: ICD-10-CM

## 2024-07-31 DIAGNOSIS — Z13.0 SCREENING FOR ENDOCRINE, METABOLIC AND IMMUNITY DISORDER: ICD-10-CM

## 2024-07-31 DIAGNOSIS — Z00.00 PHYSICAL EXAM, ANNUAL: Primary | ICD-10-CM

## 2024-07-31 DIAGNOSIS — Z13.1 SCREENING FOR DIABETES MELLITUS: ICD-10-CM

## 2024-07-31 DIAGNOSIS — Z13.220 SCREENING CHOLESTEROL LEVEL: ICD-10-CM

## 2024-07-31 NOTE — TELEPHONE ENCOUNTER
Patient notified. Patient verbalized understanding     Pt said she will have knee surgery and need a EKG done. Pt does have order for it. Advise the pt she can bring the order anywhere and they are able to do it. Advised the pt to confirm with surgeon office that pre-op is not needed and clearance is not needed from PCP prior that surgery. Pt has physical appointment with pcp not pre-op. Patient notified. Patient verbalized understanding

## 2024-07-31 NOTE — TELEPHONE ENCOUNTER
Pended labs, thanks!    Future Appointments   Date Time Provider Department Center   8/2/2024 11:00 AM Susan March MD [facility]

## 2024-07-31 NOTE — TELEPHONE ENCOUNTER
Patient would like Dr March to order her labs today so that she can get them before her appt Friday. Patient is having out patient surgery and does not want to have labs done twice.

## 2024-08-01 NOTE — PROGRESS NOTES
[facility]    CHIEF COMPLAINT:   Chief Complaint   Patient presents with    Routine Physical     5/23/22-normal pap, neg HPV. 6/27/23-mammo. 10/10/22-colon-repeat 7 years         HPI:   Ella Cook is a 64 year old female who presents for a complete physical exam. Symptoms: denies discharge, itching, burning or dysuria.     Pap done 5/2022 negative with negative hpv.   mammo done 7/2023, due now.   Colonoscopy done 10/2022, repeat in 7 years. Had an adenoma.   Dexa done 6/2024 per endocrine. She has osteoporosis.      Up to date td.   Due shingrix, rsv.   Declines covid booster.      Exercises regularly. Does yoga, pilates and walks.    Derm: sees derm regularly.     Sees endocrine for osteoporosis. On fosamax. Will be seeing endocrine. Used to see dr. Rich but she is on leave so will see one of the other physicians in the office.   Just had a bone density done.     She has right prepatellar bursitis that ruptured and is now leaking. She is seeing ortho and is getting surgery for drainage and debridement next week. Was draining pus but now serous fluid. No fever. Redness and pain have improved after the fluid drained a bit.     She is a teacher.   Her son and his family are living with her currently while they renovate their house. She is happy they are with her.     Wt Readings from Last 6 Encounters:   08/02/24 121 lb 3.2 oz (55 kg)   07/19/23 118 lb 12.8 oz (53.9 kg)   06/20/23 117 lb 9.6 oz (53.3 kg)   10/05/22 115 lb (52.2 kg)   08/09/22 116 lb 3.2 oz (52.7 kg)   05/23/22 118 lb 1.6 oz (53.6 kg)     Body mass index is 20.64 kg/m².       Current Outpatient Medications   Medication Sig Dispense Refill    cholecalciferol 50 MCG (2000 UT) Oral Tab Take 1 tablet (2,000 Units total) by mouth daily. Take 2 daily      amoxicillin clavulanate 875-125 MG Oral Tab Take 1 tablet by mouth 2 (two) times daily.      alendronate (FOSAMAX) 70 MG Oral Tab Take 1 tablet (70 mg total) by mouth every 7 days. 12 tablet 3     Fluocinonide 0.05 % External Ointment Apply 1 Dose topically as needed.  2      Past Medical History:    Age-related osteoporosis without current pathological fracture    Food intolerance    Stress incontinence      Past Surgical History:   Procedure Laterality Date    Biopsy of breast, needle core  at 24 yrs old    Colonoscopy      Colonoscopy & polypectomy  8/11/15    repeat in 5 years    Needle biopsy left      1984 bn      Family History   Problem Relation Age of Onset    Heart Attack Father 65        smoker    Heart Disorder Father     Dementia Mother     Other (Alzheimer's Disease) Mother     Cancer Paternal Grandmother     Lung Disorder Paternal Grandfather         COPD    Other (RA) Sister 64    Other (colon polyps) Sister     Cancer Brother         Melanoma    Heart Disorder Brother 65        AF, AV replacement, and CABG x 2      Social History:   Social History     Socioeconomic History    Marital status:    Tobacco Use    Smoking status: Never    Smokeless tobacco: Never   Vaping Use    Vaping status: Never Used   Substance and Sexual Activity    Alcohol use: Yes     Comment: approx 0-2 drinks per week    Drug use: No   Other Topics Concern    Caffeine Concern Yes    Stress Concern No    Weight Concern No    Special Diet No    Exercise Yes    Seat Belt Yes     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   Exercise:  exercises regularly .  Diet: watches calories closely     REVIEW OF SYSTEMS:   GENERAL: feels well otherwise  SKIN: denies any unusual skin lesions  EYES:denies blurred vision or double vision  HEENT: denies nasal congestion, sinus pain or ST  LUNGS: denies shortness of breath with exertion  CARDIOVASCULAR: denies chest pain on exertion  GI: denies abdominal pain,denies heartburn  : denies dysuria, vaginal discharge or itching  MUSCULOSKELETAL: denies back pain  NEURO: denies headaches  PSYCHE: denies depression or anxiety  HEMATOLOGIC: denies hx of anemia  ENDOCRINE: denies thyroid  history  ALL/ASTHMA: denies hx of allergy or asthma    EXAM:   /78 (BP Location: Right arm, Patient Position: Sitting, Cuff Size: adult)   Pulse 64   Temp 97.2 °F (36.2 °C) (Temporal)   Resp 12   Ht 5' 4.25\" (1.632 m)   Wt 121 lb 3.2 oz (55 kg)   Breastfeeding No   BMI 20.64 kg/m²   Body mass index is 20.64 kg/m².   GENERAL: well developed, well nourished,in no apparent distress  SKIN: no rashes,no suspicious lesions  HEENT: atraumatic, normocephalic,ears and throat are clear  EYES:PERRLA, conjunctiva are clear  NECK: supple,no adenopathy,no bruits  CHEST: no chest tenderness  LUNGS: clear to auscultation  CARDIO: nl s1 and s2, RRR without murmur  GI: good BS's,no masses, HSM or tenderness  BREAST: no dominant or suspicious mass, no nipple discharge  GENITAL/URINARY:  External Genitalia:  General appearance; normal, Hair distribution; normal, Lesions absent, Urethral Meatus:  Size normal, Location normal, Lesions absent, Prolapse absent, Vagina:  General appearance normal, Lesions absent, Pelvic support normal, Cervix:  General appearance normal, Discharge absent, Tenderness absent, Enlargement absent, Uterus:  Masses absent, Adnexa:  Masses absent, Tenderness absent, Anus/Perineum:  Lesions absent and Masses absent  No pap today.   MUSCULOSKELETAL: back is not tender,FROM of the back  EXTREMITIES: no cyanosis, clubbing or edema  NEURO: Oriented times three,cranial nerves are intact,motor and sensory are grossly intact    Labs:   Lab Results   Component Value Date/Time    WBC 4.4 08/02/2024 09:22 AM    HGB 12.4 08/02/2024 09:22 AM    .0 08/02/2024 09:22 AM      Lab Results   Component Value Date/Time    GLU 89 08/02/2024 09:22 AM     08/02/2024 09:22 AM    K 3.9 08/02/2024 09:22 AM     08/02/2024 09:22 AM    CO2 29.0 08/02/2024 09:22 AM    CREATSERUM 0.75 08/02/2024 09:22 AM    CA 9.9 08/02/2024 09:22 AM    ALB 4.3 08/02/2024 09:22 AM    TP 7.0 08/02/2024 09:22 AM    ALKPHO 70  08/02/2024 09:22 AM    AST 23 08/02/2024 09:22 AM    ALT 18 08/02/2024 09:22 AM    BILT 0.4 08/02/2024 09:22 AM    TSH 1.219 08/02/2024 09:22 AM    T4F 1.0 04/25/2014 06:20 AM        Lab Results   Component Value Date/Time    CHOLEST 168 08/02/2024 09:22 AM    HDL 66 (H) 08/02/2024 09:22 AM    TRIG 57 08/02/2024 09:22 AM    LDL 91 08/02/2024 09:22 AM    NONHDLC 102 08/02/2024 09:22 AM       Lab Results   Component Value Date/Time    A1C 5.4 06/23/2023 08:43 AM      Vitamin D:    Lab Results   Component Value Date    VITD 29.3 (L) 06/23/2023           ASSESSMENT AND PLAN:   Ella Cook is a 64 year old female who presents for a complete physical exam.   1. Physical exam, annual  Await labs. She had them done this am.  Pelvic and breast exam done.   Other HM discussed.   Immunizations discussed.   Continue regular exercise.     2. Encounter for screening mammogram for malignant neoplasm of breast  - [facility] BERNARDINO 2D+3D SCREENING BILAT (CPT=77067/62395); Future        Return in about 1 year (around 8/2/2025) for physical.      Susan March MD

## 2024-08-02 ENCOUNTER — LAB ENCOUNTER (OUTPATIENT)
Dept: LAB | Age: 64
End: 2024-08-02
Attending: INTERNAL MEDICINE
Payer: COMMERCIAL

## 2024-08-02 ENCOUNTER — OFFICE VISIT (OUTPATIENT)
Dept: INTERNAL MEDICINE CLINIC | Facility: CLINIC | Age: 64
End: 2024-08-02
Payer: COMMERCIAL

## 2024-08-02 ENCOUNTER — EKG ENCOUNTER (OUTPATIENT)
Dept: LAB | Age: 64
End: 2024-08-02
Attending: INTERNAL MEDICINE
Payer: COMMERCIAL

## 2024-08-02 VITALS
BODY MASS INDEX: 20.69 KG/M2 | WEIGHT: 121.19 LBS | TEMPERATURE: 97 F | RESPIRATION RATE: 12 BRPM | HEART RATE: 64 BPM | DIASTOLIC BLOOD PRESSURE: 78 MMHG | SYSTOLIC BLOOD PRESSURE: 120 MMHG | HEIGHT: 64.25 IN

## 2024-08-02 DIAGNOSIS — E55.9 VITAMIN D DEFICIENCY: ICD-10-CM

## 2024-08-02 DIAGNOSIS — Z13.29 SCREENING FOR ENDOCRINE, METABOLIC AND IMMUNITY DISORDER: ICD-10-CM

## 2024-08-02 DIAGNOSIS — Z13.0 SCREENING FOR ENDOCRINE, METABOLIC AND IMMUNITY DISORDER: ICD-10-CM

## 2024-08-02 DIAGNOSIS — Z12.31 ENCOUNTER FOR SCREENING MAMMOGRAM FOR MALIGNANT NEOPLASM OF BREAST: ICD-10-CM

## 2024-08-02 DIAGNOSIS — Z13.228 SCREENING FOR ENDOCRINE, METABOLIC AND IMMUNITY DISORDER: ICD-10-CM

## 2024-08-02 DIAGNOSIS — M70.41 PREPATELLAR BURSITIS OF RIGHT KNEE: Primary | ICD-10-CM

## 2024-08-02 DIAGNOSIS — Z13.29 SCREENING FOR THYROID DISORDER: ICD-10-CM

## 2024-08-02 DIAGNOSIS — Z00.00 PHYSICAL EXAM, ANNUAL: Primary | ICD-10-CM

## 2024-08-02 DIAGNOSIS — Z13.220 SCREENING CHOLESTEROL LEVEL: ICD-10-CM

## 2024-08-02 LAB
ALBUMIN SERPL-MCNC: 4.3 G/DL (ref 3.2–4.8)
ALBUMIN/GLOB SERPL: 1.6 {RATIO} (ref 1–2)
ALP LIVER SERPL-CCNC: 70 U/L
ALT SERPL-CCNC: 18 U/L
ANION GAP SERPL CALC-SCNC: 4 MMOL/L (ref 0–18)
AST SERPL-CCNC: 23 U/L (ref ?–34)
ATRIAL RATE: 51 BPM
BASOPHILS # BLD AUTO: 0.04 X10(3) UL (ref 0–0.2)
BASOPHILS NFR BLD AUTO: 0.9 %
BILIRUB SERPL-MCNC: 0.4 MG/DL (ref 0.2–1.1)
BUN BLD-MCNC: 14 MG/DL (ref 9–23)
CALCIUM BLD-MCNC: 9.9 MG/DL (ref 8.7–10.4)
CHLORIDE SERPL-SCNC: 107 MMOL/L (ref 98–112)
CHOLEST SERPL-MCNC: 168 MG/DL (ref ?–200)
CO2 SERPL-SCNC: 29 MMOL/L (ref 21–32)
CREAT BLD-MCNC: 0.75 MG/DL
EGFRCR SERPLBLD CKD-EPI 2021: 89 ML/MIN/1.73M2 (ref 60–?)
EOSINOPHIL # BLD AUTO: 0.07 X10(3) UL (ref 0–0.7)
EOSINOPHIL NFR BLD AUTO: 1.6 %
ERYTHROCYTE [DISTWIDTH] IN BLOOD BY AUTOMATED COUNT: 11.7 %
FASTING PATIENT LIPID ANSWER: YES
FASTING STATUS PATIENT QL REPORTED: YES
GLOBULIN PLAS-MCNC: 2.7 G/DL (ref 2–3.5)
GLUCOSE BLD-MCNC: 89 MG/DL (ref 70–99)
HCT VFR BLD AUTO: 36.9 %
HDLC SERPL-MCNC: 66 MG/DL (ref 40–59)
HGB BLD-MCNC: 12.4 G/DL
IMM GRANULOCYTES # BLD AUTO: 0.02 X10(3) UL (ref 0–1)
IMM GRANULOCYTES NFR BLD: 0.5 %
LDLC SERPL CALC-MCNC: 91 MG/DL (ref ?–100)
LYMPHOCYTES # BLD AUTO: 1.19 X10(3) UL (ref 1–4)
LYMPHOCYTES NFR BLD AUTO: 27.2 %
MCH RBC QN AUTO: 32.6 PG (ref 26–34)
MCHC RBC AUTO-ENTMCNC: 33.6 G/DL (ref 31–37)
MCV RBC AUTO: 97.1 FL
MONOCYTES # BLD AUTO: 0.39 X10(3) UL (ref 0.1–1)
MONOCYTES NFR BLD AUTO: 8.9 %
NEUTROPHILS # BLD AUTO: 2.66 X10 (3) UL (ref 1.5–7.7)
NEUTROPHILS # BLD AUTO: 2.66 X10(3) UL (ref 1.5–7.7)
NEUTROPHILS NFR BLD AUTO: 60.9 %
NONHDLC SERPL-MCNC: 102 MG/DL (ref ?–130)
OSMOLALITY SERPL CALC.SUM OF ELEC: 290 MOSM/KG (ref 275–295)
P AXIS: 62 DEGREES
P-R INTERVAL: 154 MS
PLATELET # BLD AUTO: 324 10(3)UL (ref 150–450)
POTASSIUM SERPL-SCNC: 3.9 MMOL/L (ref 3.5–5.1)
PROT SERPL-MCNC: 7 G/DL (ref 5.7–8.2)
Q-T INTERVAL: 420 MS
QRS DURATION: 80 MS
QTC CALCULATION (BEZET): 387 MS
R AXIS: 9 DEGREES
RBC # BLD AUTO: 3.8 X10(6)UL
SODIUM SERPL-SCNC: 140 MMOL/L (ref 136–145)
T AXIS: 24 DEGREES
TRIGL SERPL-MCNC: 57 MG/DL (ref 30–149)
TSI SER-ACNC: 1.22 MIU/ML (ref 0.55–4.78)
VENTRICULAR RATE: 51 BPM
VIT D+METAB SERPL-MCNC: 35 NG/ML (ref 30–100)
VLDLC SERPL CALC-MCNC: 9 MG/DL (ref 0–30)
WBC # BLD AUTO: 4.4 X10(3) UL (ref 4–11)

## 2024-08-02 PROCEDURE — 85025 COMPLETE CBC W/AUTO DIFF WBC: CPT

## 2024-08-02 PROCEDURE — 80061 LIPID PANEL: CPT

## 2024-08-02 PROCEDURE — 36415 COLL VENOUS BLD VENIPUNCTURE: CPT

## 2024-08-02 PROCEDURE — 93010 ELECTROCARDIOGRAM REPORT: CPT | Performed by: INTERNAL MEDICINE

## 2024-08-02 PROCEDURE — 3008F BODY MASS INDEX DOCD: CPT | Performed by: INTERNAL MEDICINE

## 2024-08-02 PROCEDURE — 80053 COMPREHEN METABOLIC PANEL: CPT

## 2024-08-02 PROCEDURE — 99396 PREV VISIT EST AGE 40-64: CPT | Performed by: INTERNAL MEDICINE

## 2024-08-02 PROCEDURE — 82306 VITAMIN D 25 HYDROXY: CPT

## 2024-08-02 PROCEDURE — 3078F DIAST BP <80 MM HG: CPT | Performed by: INTERNAL MEDICINE

## 2024-08-02 PROCEDURE — 84443 ASSAY THYROID STIM HORMONE: CPT

## 2024-08-02 PROCEDURE — 93005 ELECTROCARDIOGRAM TRACING: CPT

## 2024-08-02 PROCEDURE — 3074F SYST BP LT 130 MM HG: CPT | Performed by: INTERNAL MEDICINE

## 2024-08-02 RX ORDER — CHOLECALCIFEROL (VITAMIN D3) 50 MCG
2000 TABLET ORAL DAILY
COMMUNITY

## 2024-08-02 RX ORDER — AMOXICILLIN AND CLAVULANATE POTASSIUM 875; 125 MG/1; MG/1
1 TABLET, FILM COATED ORAL 2 TIMES DAILY
COMMUNITY
Start: 2024-07-31 | End: 2024-08-15

## 2024-08-08 ENCOUNTER — HOSPITAL ENCOUNTER (OUTPATIENT)
Dept: MAMMOGRAPHY | Age: 64
Discharge: HOME | End: 2024-08-08
Attending: INTERNAL MEDICINE
Payer: COMMERCIAL

## 2024-08-08 DIAGNOSIS — Z12.31 ENCOUNTER FOR SCREENING MAMMOGRAM FOR MALIGNANT NEOPLASM OF BREAST: ICD-10-CM

## 2024-08-08 PROCEDURE — 77067 SCR MAMMO BI INCL CAD: CPT | Performed by: INTERNAL MEDICINE

## 2024-08-08 PROCEDURE — 77063 BREAST TOMOSYNTHESIS BI: CPT | Performed by: INTERNAL MEDICINE

## 2024-09-26 ENCOUNTER — HOSPITAL ENCOUNTER (EMERGENCY)
Facility: HOSPITAL | Age: 64
Discharge: HOME | End: 2024-09-26
Attending: EMERGENCY MEDICINE
Payer: COMMERCIAL

## 2024-09-26 VITALS
SYSTOLIC BLOOD PRESSURE: 140 MMHG | TEMPERATURE: 97 F | DIASTOLIC BLOOD PRESSURE: 89 MMHG | RESPIRATION RATE: 18 BRPM | OXYGEN SATURATION: 96 % | HEIGHT: 64 IN | HEART RATE: 74 BPM | BODY MASS INDEX: 20.49 KG/M2 | WEIGHT: 120 LBS

## 2024-09-26 DIAGNOSIS — Z20.9 EXPOSURE TO BAT WITHOUT KNOWN BITE: Primary | ICD-10-CM

## 2024-09-26 PROCEDURE — 90471 IMMUNIZATION ADMIN: CPT

## 2024-09-26 PROCEDURE — 99284 EMERGENCY DEPT VISIT MOD MDM: CPT

## 2024-09-26 PROCEDURE — 99283 EMERGENCY DEPT VISIT LOW MDM: CPT

## 2024-09-26 PROCEDURE — 96372 THER/PROPH/DIAG INJ SC/IM: CPT

## 2024-09-27 NOTE — ED INITIAL ASSESSMENT (HPI)
Pt comes from home with reports of a bat being in the house. Patient reports not knowing if they were bit but the bat tested positive for rabies. Concerned there was a chance they were bit and don't know especially due to rabies.

## 2024-09-27 NOTE — DISCHARGE INSTRUCTIONS
To complete treatment after a potential exposure to Rabies you will need 3 more vaccines.  These vaccines will need to be administered on 9/29/2024, 10/3/2024 and 10/10/2024    We are arranging for these to be administered at:    [facility] at [address] [address]  [phone]  Hours of Operation:   Monday through Friday: 8am-8pm  Saturday and Sunday: 8am-4pm  No appointment is needed    If you have any questions about the follow up care required, please contact the [facility] Emergency Room  at [phone].

## 2024-09-27 NOTE — ED PROVIDER NOTES
Patient Seen in: [facility] Emergency Department      History     Chief Complaint   Patient presents with    Other     Stated Complaint: bat in house, no bite    Subjective:   HPI    64-year-old female presents to the ED with concern for rabies exposure.  Patient noted a bat flying in her house.  Does not know how long it has been there for.  Does not know if it has bit her while she was sleeping.  It was caught in the house today and sent for testing and was found to be positive for rabies virus.  Patient presents to ED requesting vaccination.  No known bite.  No other complaints.    Objective:   Past Medical History:    Age-related osteoporosis without current pathological fracture    Food intolerance    Stress incontinence              Past Surgical History:   Procedure Laterality Date    Biopsy of breast, needle core  at 24 yrs old    Colonoscopy      Colonoscopy & polypectomy  8/11/15    repeat in 5 years    Needle biopsy left      1984 bn                Social History     Socioeconomic History    Marital status:    Tobacco Use    Smoking status: Never    Smokeless tobacco: Never   Vaping Use    Vaping status: Never Used   Substance and Sexual Activity    Alcohol use: Yes     Comment: approx 0-2 drinks per week    Drug use: No   Other Topics Concern    Caffeine Concern Yes    Stress Concern No    Weight Concern No    Special Diet No    Exercise Yes    Seat Belt Yes              Review of Systems    Positive for stated Chief Complaint: Other    Other systems are as noted in HPI.  Constitutional and vital signs reviewed.      All other systems reviewed and negative except as noted above.    Physical Exam     ED Triage Vitals [09/26/24 1924]   /88   Pulse 68   Resp 16   Temp 97.4 °F (36.3 °C)   Temp src Temporal   SpO2 96 %   O2 Device None (Room air)       Current Vitals:   Vital Signs  BP: 124/88  Pulse: 68  Resp: 16  Temp: 97.4 °F (36.3 °C)  Temp src: Temporal    Oxygen Therapy  SpO2: 96  %  O2 Device: None (Room air)            Physical Exam  Vitals and nursing note reviewed.   Constitutional:       General: She is not in acute distress.     Appearance: She is well-developed. She is not toxic-appearing.   HENT:      Head: Normocephalic and atraumatic.   Eyes:      General: No scleral icterus.     Conjunctiva/sclera: Conjunctivae normal.   Cardiovascular:      Rate and Rhythm: Normal rate.   Pulmonary:      Effort: Pulmonary effort is normal. No respiratory distress.   Abdominal:      General: There is no distension.   Musculoskeletal:         General: No tenderness. Normal range of motion.      Cervical back: Normal range of motion and neck supple.   Skin:     General: Skin is warm and dry.      Findings: No rash.   Neurological:      Mental Status: She is alert and oriented to person, place, and time.      Motor: No abnormal muscle tone.   Psychiatric:         Behavior: Behavior normal.              ED Course   Labs Reviewed - No data to display                    MDM            -Pulse oximetry was interpreted by me and was normal.  Pulse oximeter was ordered to monitor patient for hypoxia.        -History source other than patient  -partner        -Comorbidities did add complexity to the management are mentioned in the HPI above        -I personally reviewed the prior external notes and the medical record to obtain additional history reviewed office visit note August 21 at [email or web address] medicine orthopedics patient seen for prepatellar bursitis of the right knee        -DDX: Includes but not limited to -rabies exposure, animal bite cellulitis        Medications   rabies immune globulin (HyperRAB) 300 Units/mL injection 900 Units (900 Units Intramuscular Given 9/26/24 2314)   rabies vaccine, PCEC (Rabavert) injection 1 mL (1 mL Intramuscular Given 9/26/24 2313)     Patient follow-up for remaining vaccine series.  Patient discharged stable condition.                                     Medical Decision  Making      Disposition and Plan     Clinical Impression:  1. Exposure to bat without known bite         Disposition:  Discharge  9/26/2024 11:18 pm    Follow-up:  [facility] [address] [id number] [phone]  In 3 days  rabies vaccine series          Medications Prescribed:  Current Discharge Medication List

## 2024-09-29 ENCOUNTER — HOSPITAL ENCOUNTER (OUTPATIENT)
Age: 64
Discharge: HOME | End: 2024-09-29
Payer: COMMERCIAL

## 2024-09-29 PROCEDURE — 90675 RABIES VACCINE IM: CPT | Performed by: NURSE PRACTITIONER

## 2024-09-29 PROCEDURE — 90471 IMMUNIZATION ADMIN: CPT | Performed by: NURSE PRACTITIONER

## 2024-10-03 ENCOUNTER — HOSPITAL ENCOUNTER (OUTPATIENT)
Age: 64
Discharge: HOME | End: 2024-10-03
Payer: COMMERCIAL

## 2024-10-03 PROCEDURE — 90675 RABIES VACCINE IM: CPT | Performed by: NURSE PRACTITIONER

## 2024-10-03 PROCEDURE — 90471 IMMUNIZATION ADMIN: CPT | Performed by: NURSE PRACTITIONER

## 2024-10-03 NOTE — ED QUICK NOTES
Patient states that she will be unable to receive 4th and final dose of rabies vaccine on day 14 due to traveling in a remote area in [place]. Patient requesting to come for final dose on day 13. Spoke with Tracy, clinical leader, and was informed this would be ok. Patient advised to come for final dose the evening before departure on 10/9/2024.

## 2024-10-09 ENCOUNTER — HOSPITAL ENCOUNTER (OUTPATIENT)
Age: 64
Discharge: HOME | End: 2024-10-09
Payer: COMMERCIAL

## 2024-10-09 VITALS
HEIGHT: 64 IN | OXYGEN SATURATION: 100 % | TEMPERATURE: 98 F | SYSTOLIC BLOOD PRESSURE: 154 MMHG | RESPIRATION RATE: 18 BRPM | DIASTOLIC BLOOD PRESSURE: 86 MMHG | WEIGHT: 120 LBS | BODY MASS INDEX: 20.49 KG/M2 | HEART RATE: 70 BPM

## 2024-10-09 DIAGNOSIS — R03.0 ELEVATED BP WITHOUT DIAGNOSIS OF HYPERTENSION: Primary | ICD-10-CM

## 2024-10-09 PROCEDURE — 90471 IMMUNIZATION ADMIN: CPT | Performed by: NURSE PRACTITIONER

## 2024-10-09 PROCEDURE — 99213 OFFICE O/P EST LOW 20 MIN: CPT | Performed by: NURSE PRACTITIONER

## 2024-10-09 PROCEDURE — 90675 RABIES VACCINE IM: CPT | Performed by: NURSE PRACTITIONER

## 2024-10-09 NOTE — DISCHARGE INSTRUCTIONS
Keep a diary of symptoms and blood pressure readings.     Make sure when taking BP at home you are resting for at least 10 min, both feet flat on the floor and arm resting at heart level.     Follow up with PCP when return from trip.     If any worsening or new concerning symptoms develop please go to the emergency room.

## 2024-10-09 NOTE — ED INITIAL ASSESSMENT (HPI)
Pt c/o elevated blood pressure. Pt states she's been taking her blood pressure over the last few weeks. Pt states when she visited the ed 2 weeks ago her blood pressure was elevated and she's been concerned. Pt denies chest pain and shortness of breath. Pt states she' had occasional headaches but that's not new.

## 2024-10-11 NOTE — ED PROVIDER NOTES
Patient Seen in: [facility]      History     Chief Complaint   Patient presents with    Blood Pressure     Stated Complaint: HIgh blood pressure    Subjective:   65 yo female presents with complaint of elevated blood pressure readings for the last couple weeks (since began receiving rabies vaccines).  Noticed it first in the ED 2 weeks ago. Subsequent readings at home have been slightly elevated. Today, came to  for last rabies dose and BP elevated again.   Works out daily without any symptoms- walking, yoga.   Does not use much salt or heavy salted food.  No excessive use of caffeinated drinks; coffee, energy drinks, or new meds/supplements or other substances.   Has not discussed concerns with PCP yet.      Pt denies fevers, chills, sweats, cough, dyspnea on exertion, shortness of breath, palpitations, dizziness, visual changes, numbness/tingling, syncope, nausea, vomiting, abdominal pain, back pain.      The history is provided by the patient.             Objective:     Past Medical History:    Age-related osteoporosis without current pathological fracture    Food intolerance    Stress incontinence              Past Surgical History:   Procedure Laterality Date    Biopsy of breast, needle core  at 24 yrs old    Colonoscopy      Colonoscopy & polypectomy  8/11/15    repeat in 5 years    Needle biopsy left      1984 bn                Social History     Socioeconomic History    Marital status:    Tobacco Use    Smoking status: Never    Smokeless tobacco: Never   Vaping Use    Vaping status: Never Used   Substance and Sexual Activity    Alcohol use: Yes     Comment: approx 0-2 drinks per week    Drug use: No   Other Topics Concern    Caffeine Concern Yes    Stress Concern No    Weight Concern No    Special Diet No    Exercise Yes    Seat Belt Yes              Review of Systems   Constitutional:  Negative for appetite change, chills, diaphoresis, fatigue and fever.   HENT:  Negative for  trouble swallowing.    Respiratory:  Negative for cough, choking, chest tightness, shortness of breath and wheezing.    Cardiovascular:  Negative for chest pain, palpitations and leg swelling.   Gastrointestinal:  Negative for abdominal pain, nausea and vomiting.   Neurological:  Negative for dizziness, syncope, facial asymmetry, weakness, light-headedness, numbness and headaches.       Positive for stated complaint: HIgh blood pressure  Other systems are as noted in HPI.  Constitutional and vital signs reviewed.      All other systems reviewed and negative except as noted above.    Physical Exam     ED Triage Vitals [10/09/24 1749]   BP (!) 161/98   Pulse 70   Resp 18   Temp 98.4 °F (36.9 °C)   Temp src Temporal   SpO2 100 %   O2 Device None (Room air)     Recheck: with manual cuff: 154/86    Current Vitals:   No data recorded    Physical Exam  Vitals and nursing note reviewed.   Constitutional:       General: She is not in acute distress.     Appearance: Normal appearance. She is normal weight. She is not ill-appearing, toxic-appearing or diaphoretic.   HENT:      Head: Normocephalic.      Nose: Nose normal.      Mouth/Throat:      Mouth: Mucous membranes are moist.      Pharynx: Oropharynx is clear.   Eyes:      General: No scleral icterus.     Conjunctiva/sclera: Conjunctivae normal.   Neck:      Vascular: No carotid bruit.   Cardiovascular:      Rate and Rhythm: Normal rate and regular rhythm.      Pulses: Normal pulses.      Heart sounds: No murmur heard.     No friction rub.   Pulmonary:      Effort: Pulmonary effort is normal. No respiratory distress.      Breath sounds: Normal breath sounds. No stridor. No wheezing, rhonchi or rales.   Abdominal:      General: Abdomen is flat. Bowel sounds are normal.      Palpations: Abdomen is soft.      Tenderness: There is no abdominal tenderness.   Musculoskeletal:         General: Normal range of motion.      Cervical back: Normal range of motion. No tenderness.       Right lower leg: No edema.      Left lower leg: No edema.   Skin:     General: Skin is warm and dry.   Neurological:      General: No focal deficit present.      Mental Status: She is alert and oriented to person, place, and time. Mental status is at baseline.      Cranial Nerves: No cranial nerve deficit.      Sensory: No sensory deficit.      Motor: No weakness.      Coordination: Coordination normal.      Gait: Gait normal.      Deep Tendon Reflexes: Reflexes normal.   Psychiatric:         Mood and Affect: Mood normal.             ED Course   Labs Reviewed - No data to display                MDM              Medical Decision Making  63 yo female presents with complaint of elevated blood pressure readings for the last couple weeks (since began receiving rabies vaccines).   Differential diagnoses include hypertension vs increased caffeine intake vs other  On exam patient is well-appearing BP slightly elevated on recheck it was 154/86, afebrile, no chest pain, shortness of breath, dyspnea on exertion, visual changes or weakness.  Declines EKG.  Discussed proper way to take blood pressure at home-see AVS  Monitor salt intake caffeine, keep a diary of your blood pressure readings to present to your PCP when you return from your trip next week.  ED precautions emphasized for any worsening, or new/concerning symptoms if they occur.   Patient in agreement and understands plan.    Amount and/or Complexity of Data Reviewed  External Data Reviewed: labs and notes.        Disposition and Plan     Clinical Impression:  1. Elevated BP without diagnosis of hypertension         Disposition:  Discharge  10/9/2024  6:20 pm    Follow-up:  Susan March MD  [address] [address]  [phone]    In 3 days            Medications Prescribed:  Discharge Medication List as of 10/9/2024  6:19 PM              Supplementary Documentation:

## 2024-10-31 ENCOUNTER — TELEPHONE (OUTPATIENT)
Dept: INTERNAL MEDICINE CLINIC | Facility: CLINIC | Age: 64
End: 2024-10-31

## 2024-10-31 NOTE — TELEPHONE ENCOUNTER
Pt calling said that her BP been running high for a while. Pt check the BP at home and said that is this running at 134/89, 154/95, 151/91. Denies any symptoms of SOB, chest pain, weakness in the body. Pt does have headaches sometimes. Made appointment for the pt and advised to  bring BP cuff to appointment and also if any symptoms develop or uncontrolled BP to go to ER.   Patient notified. Patient verbalized understanding       Future Appointments   Date Time Provider Department Center   11/4/2024  3:20 PM Maribel Orozco APRN [facility]   11/19/2024  9:30 AM Geena Dutton DO [id number]

## 2024-11-04 ENCOUNTER — OFFICE VISIT (OUTPATIENT)
Dept: INTERNAL MEDICINE CLINIC | Facility: CLINIC | Age: 64
End: 2024-11-04
Payer: COMMERCIAL

## 2024-11-04 VITALS
OXYGEN SATURATION: 98 % | HEIGHT: 64 IN | TEMPERATURE: 98 F | SYSTOLIC BLOOD PRESSURE: 122 MMHG | BODY MASS INDEX: 21.11 KG/M2 | HEART RATE: 68 BPM | WEIGHT: 123.63 LBS | RESPIRATION RATE: 16 BRPM | DIASTOLIC BLOOD PRESSURE: 70 MMHG

## 2024-11-04 DIAGNOSIS — R03.0 ELEVATED BLOOD PRESSURE READING: Primary | ICD-10-CM

## 2024-11-04 PROCEDURE — 3078F DIAST BP <80 MM HG: CPT | Performed by: NURSE PRACTITIONER

## 2024-11-04 PROCEDURE — 3008F BODY MASS INDEX DOCD: CPT | Performed by: NURSE PRACTITIONER

## 2024-11-04 PROCEDURE — 99213 OFFICE O/P EST LOW 20 MIN: CPT | Performed by: NURSE PRACTITIONER

## 2024-11-04 PROCEDURE — G2211 COMPLEX E/M VISIT ADD ON: HCPCS | Performed by: NURSE PRACTITIONER

## 2024-11-04 PROCEDURE — 3074F SYST BP LT 130 MM HG: CPT | Performed by: NURSE PRACTITIONER

## 2024-11-04 RX ORDER — DESONIDE 0.5 MG/G
OINTMENT TOPICAL
COMMUNITY
Start: 2024-10-17

## 2024-11-04 RX ORDER — VALACYCLOVIR HYDROCHLORIDE 1 G/1
2000 TABLET, FILM COATED ORAL 2 TIMES DAILY
COMMUNITY
Start: 2024-10-08 | End: 2024-11-04 | Stop reason: ALTCHOICE

## 2024-11-04 NOTE — PROGRESS NOTES
CHIEF COMPLAINT:     Chief Complaint   Patient presents with    Blood Pressure     Last couple months BP has become elevated - pt has always had low BP her whole life until now, pt did buy a BP monitor and checking at home - did bring some readings in today    Pt is not having any symptoms of High BP - unknown reason for change in BP       HPI:   Ella Cook is a 64 year old female coming in with complaints of elevated BP.     She went to  to get rabies vaccine after exposure to a bat and her BP there both times has been elevated around 140-150/80-90s. She was advised to monitor it at home. She has been getting readings around 130-150/80-90s at home. Denies any headaches, chest pain, shortness of breath, or palpitations. No increase in salt. Denies any significant stress. Exercises regularly. BP today is normal, her BP machine was calibrated and is accurate.    Past Medical History:    Age-related osteoporosis without current pathological fracture    Food intolerance    Stress incontinence      Past Surgical History:   Procedure Laterality Date    Biopsy of breast, needle core  at 24 yrs old    Colonoscopy      Colonoscopy & polypectomy  8/11/15    repeat in 5 years    Needle biopsy left      1984 bn      Social History:  Social History     Socioeconomic History    Marital status:    Tobacco Use    Smoking status: Never     Passive exposure: Never    Smokeless tobacco: Never   Vaping Use    Vaping status: Never Used   Substance and Sexual Activity    Alcohol use: Yes     Comment: approx 0-2 drinks per week    Drug use: No   Other Topics Concern    Caffeine Concern Yes    Stress Concern No    Weight Concern No    Special Diet No    Exercise Yes    Seat Belt Yes      Family History:  Family History   Problem Relation Age of Onset    Heart Attack Father 65        smoker    Heart Disorder Father     Dementia Mother     Other (Alzheimer's Disease) Mother     Cancer Paternal Grandmother     Lung Disorder  Paternal Grandfather         COPD    Other (RA) Sister 64    Other (colon polyps) Sister     Cancer Brother         Melanoma    Heart Disorder Brother 65        AF, AV replacement, and CABG x 2      Allergies:  Allergies[1]   Current Meds:  Current Outpatient Medications   Medication Sig Dispense Refill    Desonide 0.05 % External Ointment APPLY TO THE AFFECTED AREA ON LIPS TWICE DAILY FOR 2 WEEKS UNTIL CLEAR      cholecalciferol 50 MCG (2000 UT) Oral Tab Take 2 tablets (4,000 Units total) by mouth daily.      Fluocinonide 0.05 % External Ointment Apply 1 Dose topically as needed.  2    alendronate (FOSAMAX) 70 MG Oral Tab Take 1 tablet (70 mg total) by mouth every 7 days. (Patient not taking: Reported on 11/4/2024) 12 tablet 3       Counseling given: Not Answered       REVIEW OF SYSTEMS:   See HPI.    EXAM:     /70 (BP Location: Left arm, Patient Position: Sitting, Cuff Size: adult)   Pulse 68   Temp 97.5 °F (36.4 °C) (Temporal)   Resp 16   Ht 5' 4\" (1.626 m)   Wt 123 lb 9.6 oz (56.1 kg)   SpO2 98%   BMI 21.22 kg/m²   Body mass index is 21.22 kg/m².   Vital signs reviewed. Appears stated age, well groomed, in no acute distress.  Physical Exam:  GENERAL: Patient is alert, awake and oriented, well developed, well nourished.  HEENT: Head: Normocephalic, atraumatic.   HEART: RRR without murmur.  LUNGS: Clear to auscultation bilaterally, no rales/rhonchi/wheezing.  ABDOMEN: good BS's, no masses, HSM or tenderness  MUSCULOSKELETAL: No obvious joint deformity or swelling.   EXTREMITIES: No edema, no cyanosis, no clubbing  NEURO: Oriented time three.     LABS:      Lab Results   Component Value Date    WBC 4.4 08/02/2024    RBC 3.80 08/02/2024    HGB 12.4 08/02/2024    HCT 36.9 08/02/2024    MCV 97.1 08/02/2024    MCH 32.6 08/02/2024    MCHC 33.6 08/02/2024    RDW 11.7 08/02/2024    .0 08/02/2024      Lab Results   Component Value Date    GLU 89 08/02/2024    BUN 14 08/02/2024    BUNCREA 13.8 06/09/2021     CREATSERUM 0.75 08/02/2024    ANIONGAP 4 08/02/2024    GFR 77 07/14/2017    GFRNAA 89 06/22/2022    GFRAA 103 06/22/2022    CA 9.9 08/02/2024    OSMOCALC 290 08/02/2024    ALKPHO 70 08/02/2024    AST 23 08/02/2024    ALT 18 08/02/2024    BILT 0.4 08/02/2024    TP 7.0 08/02/2024    ALB 4.3 08/02/2024    GLOBULIN 2.7 08/02/2024     08/02/2024    K 3.9 08/02/2024     08/02/2024    CO2 29.0 08/02/2024      Lab Results   Component Value Date    CHOLEST 168 08/02/2024    TRIG 57 08/02/2024    HDL 66 (H) 08/02/2024    LDL 91 08/02/2024    VLDL 9 08/02/2024    TCHDLRATIO 1.84 07/14/2017    NONHDLC 102 08/02/2024      Lab Results   Component Value Date    T4F 1.0 04/25/2014    TSH 1.219 08/02/2024      Lab Results   Component Value Date     06/23/2023    A1C 5.4 06/23/2023        IMAGING:     No results found.     ASSESSMENT AND PLAN:   1. Elevated blood pressure reading  -Manual BP is 122/70 today.   -Home BP machine is calibrated and is accurate  -Continue monitoring BP at home and send me readings in the next 2-3 weeks  -Low salt diet  -Follow up if BP persistently >140/90     The patient indicates understanding of these issues and agrees to the plan.  Return if symptoms worsen or fail to improve.    Maribel Orozco, APRN  11/4/2024         [1] No Known Allergies

## 2024-12-11 ENCOUNTER — E-VISIT (OUTPATIENT)
Dept: TELEHEALTH | Age: 64
End: 2024-12-11
Payer: COMMERCIAL

## 2024-12-11 DIAGNOSIS — R39.9 UTI SYMPTOMS: Primary | ICD-10-CM

## 2024-12-13 PROCEDURE — 99421 OL DIG E/M SVC 5-10 MIN: CPT | Performed by: PHYSICIAN ASSISTANT

## 2024-12-13 NOTE — PROGRESS NOTES
Ella Cook is a 64 year old female.  HPI:   See answers to questions above.     Current Outpatient Medications   Medication Sig Dispense Refill    Desonide 0.05 % External Ointment APPLY TO THE AFFECTED AREA ON LIPS TWICE DAILY FOR 2 WEEKS UNTIL CLEAR      cholecalciferol 50 MCG (2000 UT) Oral Tab Take 2 tablets (4,000 Units total) by mouth daily.      alendronate (FOSAMAX) 70 MG Oral Tab Take 1 tablet (70 mg total) by mouth every 7 days. (Patient not taking: Reported on 11/4/2024) 12 tablet 3    Fluocinonide 0.05 % External Ointment Apply 1 Dose topically as needed.  2      Past Medical History:    Age-related osteoporosis without current pathological fracture    Food intolerance    Stress incontinence      Past Surgical History:   Procedure Laterality Date    Biopsy of breast, needle core  at 24 yrs old    Colonoscopy      Colonoscopy & polypectomy  8/11/15    repeat in 5 years    Needle biopsy left      1984 bn      Family History   Problem Relation Age of Onset    Heart Attack Father 65        smoker    Heart Disorder Father     Dementia Mother     Other (Alzheimer's Disease) Mother     Cancer Paternal Grandmother     Lung Disorder Paternal Grandfather         COPD    Other (RA) Sister 64    Other (colon polyps) Sister     Cancer Brother         Melanoma    Heart Disorder Brother 65        AF, AV replacement, and CABG x 2      Social History:  Social History     Socioeconomic History    Marital status:    Tobacco Use    Smoking status: Never     Passive exposure: Never    Smokeless tobacco: Never   Vaping Use    Vaping status: Never Used   Substance and Sexual Activity    Alcohol use: Yes     Comment: approx 0-2 drinks per week    Drug use: No   Other Topics Concern    Caffeine Concern Yes    Stress Concern No    Weight Concern No    Special Diet No    Exercise Yes    Seat Belt Yes         ASSESSMENT AND PLAN:     Encounter Diagnosis   Name Primary?    UTI symptoms Yes     No reply received back from  pt after multiple messages sent. Closed out visit.      Meds & Refills for this Visit:  Requested Prescriptions      No prescriptions requested or ordered in this encounter       Duration of  the service:  5 minutes

## 2024-12-31 ENCOUNTER — HOSPITAL ENCOUNTER (OUTPATIENT)
Age: 64
Discharge: HOME | End: 2024-12-31
Payer: COMMERCIAL

## 2024-12-31 VITALS
WEIGHT: 120 LBS | SYSTOLIC BLOOD PRESSURE: 140 MMHG | HEART RATE: 68 BPM | HEIGHT: 64 IN | RESPIRATION RATE: 20 BRPM | BODY MASS INDEX: 20.49 KG/M2 | DIASTOLIC BLOOD PRESSURE: 86 MMHG | TEMPERATURE: 98 F | OXYGEN SATURATION: 99 %

## 2024-12-31 DIAGNOSIS — R30.0 DYSURIA: Primary | ICD-10-CM

## 2024-12-31 DIAGNOSIS — R35.0 FREQUENT URINATION: ICD-10-CM

## 2024-12-31 LAB
CLARITY UR: CLEAR
COLOR UR: YELLOW
GLUCOSE UR STRIP-MCNC: 100 MG/DL
NITRITE UR QL STRIP: POSITIVE
PH UR STRIP: 6.5 [PH]
PROT UR STRIP-MCNC: 30 MG/DL
SP GR UR STRIP: 1.01
UROBILINOGEN UR STRIP-ACNC: <2 MG/DL

## 2024-12-31 PROCEDURE — 99214 OFFICE O/P EST MOD 30 MIN: CPT | Performed by: PHYSICIAN ASSISTANT

## 2024-12-31 PROCEDURE — 81002 URINALYSIS NONAUTO W/O SCOPE: CPT | Performed by: PHYSICIAN ASSISTANT

## 2024-12-31 PROCEDURE — 87086 URINE CULTURE/COLONY COUNT: CPT | Performed by: PHYSICIAN ASSISTANT

## 2024-12-31 PROCEDURE — 87186 SC STD MICRODIL/AGAR DIL: CPT | Performed by: PHYSICIAN ASSISTANT

## 2024-12-31 PROCEDURE — 87077 CULTURE AEROBIC IDENTIFY: CPT | Performed by: PHYSICIAN ASSISTANT

## 2024-12-31 RX ORDER — NITROFURANTOIN 25; 75 MG/1; MG/1
100 CAPSULE ORAL 2 TIMES DAILY
Qty: 10 CAPSULE | Refills: 0 | Status: SHIPPED | OUTPATIENT
Start: 2024-12-31 | End: 2025-01-05

## 2024-12-31 NOTE — DISCHARGE INSTRUCTIONS
Please return to the ER/clinic if symptoms worsen. Follow-up with your PCP in 24-48 hours as needed.  Push fluids.  Take the full course of antibiotics as prescribed.  Recommend probiotics.  Will follow-up with the culture if anything needs to be changed.

## 2024-12-31 NOTE — ED PROVIDER NOTES
Patient Seen in: [facility]      History     Chief Complaint   Patient presents with    Urinary Symptoms     Stated Complaint: UTI symptoms    Subjective:   HPI      64-year-old female here with complaint of dysuria urgency and frequency that started yesterday.  Patient has had frequent UTIs due to a uterine prolapse.  Patient denies chest pain, shortness of breath, cough, abdominal pain, nausea, vomiting or diarrhea.  Patient unaware of hematuria denies flank pain.  Afebrile.    Objective:     Past Medical History:    Age-related osteoporosis without current pathological fracture    Food intolerance    Stress incontinence            The patient's medication list, past medical history and social history elements  as listed in today's nurse's notes are reviewed and agree.   The patient's family history is reviewed and is noncontributory to the presenting problem, except as indicated as above.     Past Surgical History:   Procedure Laterality Date    Biopsy of breast, needle core  at 24 yrs old    Colonoscopy      Colonoscopy & polypectomy  8/11/15    repeat in 5 years    Needle biopsy left      1984 bn                Social History     Socioeconomic History    Marital status:    Tobacco Use    Smoking status: Never     Passive exposure: Never    Smokeless tobacco: Never   Vaping Use    Vaping status: Never Used   Substance and Sexual Activity    Alcohol use: Yes     Comment: approx 0-2 drinks per week    Drug use: No   Other Topics Concern    Caffeine Concern Yes    Stress Concern No    Weight Concern No    Special Diet No    Exercise Yes    Seat Belt Yes              Review of Systems    Positive for stated complaint: UTI symptoms  Other systems are as noted in HPI.  Constitutional and vital signs reviewed.      All other systems reviewed and negative except as noted above.    Physical Exam     ED Triage Vitals [12/31/24 0819]   /86   Pulse 68   Resp 18   Temp 97.5 °F (36.4 °C)    Temp src Oral   SpO2 98 %   O2 Device None (Room air)       Current Vitals:   Vital Signs  BP: 140/86  Pulse: 68  Resp: 20  Temp: 97.5 °F (36.4 °C)  Temp src: Oral    Oxygen Therapy  SpO2: 99 %  O2 Device: None (Room air)        Physical Exam  Vitals and nursing note reviewed.   Constitutional:       Appearance: Normal appearance. She is well-developed.   HENT:      Head: Normocephalic.      Right Ear: External ear normal.      Left Ear: External ear normal.      Nose: Nose normal.      Mouth/Throat:      Mouth: Mucous membranes are moist.   Eyes:      Conjunctiva/sclera: Conjunctivae normal.      Pupils: Pupils are equal, round, and reactive to light.   Cardiovascular:      Rate and Rhythm: Normal rate and regular rhythm.      Heart sounds: Normal heart sounds.   Pulmonary:      Effort: Pulmonary effort is normal.      Breath sounds: Normal breath sounds.   Abdominal:      Tenderness: There is no right CVA tenderness or left CVA tenderness.   Musculoskeletal:      Cervical back: Normal range of motion and neck supple.   Skin:     General: Skin is warm.      Capillary Refill: Capillary refill takes less than 2 seconds.   Neurological:      General: No focal deficit present.      Mental Status: She is alert and oriented to person, place, and time.   Psychiatric:         Mood and Affect: Mood normal.         Behavior: Behavior normal.         Thought Content: Thought content normal.         Judgment: Judgment normal.           ED Course     Labs Reviewed   [facility] POCT URINALYSIS DIPSTICK - Abnormal; Notable for the following components:       Result Value    Protein urine 30 (*)     Glucose, Urine 100 (*)     Ketone, Urine Trace (*)     Bilirubin, Urine Small (*)     Blood, Urine Moderate (*)     Nitrite Urine Positive (*)     Leukocyte esterase urine Large (*)     All other components within normal limits   URINE CULTURE, ROUTINE                   MDM     Clinical Impression: frequent UTI's  Course of Treatment:    Push fluids.  Take the full course of antibiotics as prescribed.  Recommend probiotics.  Will follow-up with the culture if anything needs to be changed.    The patient is encouraged to return if any concerning symptoms arise. Additional verbal discharge instructions are given and discussed. Discharge medications are discussed. The patient is in good condition throughout the visit today and remains so upon discharge. I discuss the plan of care with the patient, who expresses understanding. All questions and concerns are addressed to the patient's satisfaction prior to discharge today.  Previous conversations with PCP and charts were reviewed.              Disposition and Plan     Clinical Impression:  1. Dysuria    2. Frequent urination         Disposition:  Discharge  12/31/2024  8:47 am    Follow-up:  Susan March MD  [address] [address] [phone]                Medications Prescribed:  Current Discharge Medication List        START taking these medications    Details   nitrofurantoin monohydrate macro 100 MG Oral Cap Take 1 capsule (100 mg total) by mouth 2 (two) times daily for 5 days.  Qty: 10 capsule, Refills: 0                 Supplementary Documentation:

## 2025-04-17 ENCOUNTER — HOSPITAL ENCOUNTER (OUTPATIENT)
Age: 65
Discharge: HOME | End: 2025-04-17
Payer: COMMERCIAL

## 2025-04-17 ENCOUNTER — APPOINTMENT (OUTPATIENT)
Dept: GENERAL RADIOLOGY | Age: 65
End: 2025-04-17
Attending: NURSE PRACTITIONER
Payer: COMMERCIAL

## 2025-04-17 VITALS
SYSTOLIC BLOOD PRESSURE: 123 MMHG | OXYGEN SATURATION: 100 % | RESPIRATION RATE: 18 BRPM | DIASTOLIC BLOOD PRESSURE: 84 MMHG | TEMPERATURE: 98 F | HEART RATE: 69 BPM

## 2025-04-17 DIAGNOSIS — M79.89 SWELLING OF FINGER: ICD-10-CM

## 2025-04-17 DIAGNOSIS — L03.011 CELLULITIS OF FINGER OF RIGHT HAND: Primary | ICD-10-CM

## 2025-04-17 PROCEDURE — 99213 OFFICE O/P EST LOW 20 MIN: CPT | Performed by: NURSE PRACTITIONER

## 2025-04-17 PROCEDURE — 73140 X-RAY EXAM OF FINGER(S): CPT | Performed by: NURSE PRACTITIONER

## 2025-04-17 RX ORDER — CEFUROXIME AXETIL 500 MG/1
500 TABLET ORAL 2 TIMES DAILY
Qty: 10 TABLET | Refills: 0 | Status: SHIPPED | OUTPATIENT
Start: 2025-04-17 | End: 2025-04-22

## 2025-04-17 NOTE — DISCHARGE INSTRUCTIONS
Cefuroxime: take 1 tab twice a day for 5 days.     Warm soaks.     If symptoms persist, please follow up with Orthopedic hand; name/number attached.

## 2025-04-17 NOTE — ED INITIAL ASSESSMENT (HPI)
Patient states 2 days ago- was carrying wood and believes there is a splinter to her right 5th digit

## 2025-04-17 NOTE — ED PROVIDER NOTES
Patient Seen in: [facility]      History     Chief Complaint   Patient presents with    Arm or Hand Injury     infected finger - Entered by patient    FB in Skin     Stated Complaint: Arm or Hand Injury - infected finger    Subjective:   65-year-old female presents with complaint of tenderness, erythema, and swelling to the right fifth digit after carrying a pile of wood about 2 days ago.  Patient states that she thought she acquired a splinter but when tried to remove, nothing was there. Pt has a small puncture type wound to finger.   Patient is concerned because she had to have knee surgery due to an infected bursa from a possible insect bite or other type of wound and did not want to wait for the finger to worsen.    Last Tdap in 2022.    Patient denies fever, chills, malaise.    The history is provided by the patient.       History of Present Illness               Objective:     Past Medical History:    Age-related osteoporosis without current pathological fracture    Food intolerance    Stress incontinence              Past Surgical History:   Procedure Laterality Date    Biopsy of breast, needle core  at 24 yrs old    Colonoscopy      Colonoscopy & polypectomy  8/11/15    repeat in 5 years    Needle biopsy left      1984 bn                Social History     Socioeconomic History    Marital status:    Tobacco Use    Smoking status: Never     Passive exposure: Never    Smokeless tobacco: Never   Vaping Use    Vaping status: Never Used   Substance and Sexual Activity    Alcohol use: Yes     Comment: approx 0-2 drinks per week    Drug use: No   Other Topics Concern    Caffeine Concern Yes    Stress Concern No    Weight Concern No    Special Diet No    Exercise Yes    Seat Belt Yes              Review of Systems   Constitutional:  Negative for chills and fever.   Skin:  Positive for wound.   Neurological:  Negative for numbness.       Positive for stated complaint: Arm or Hand Injury -  infected finger  Other systems are as noted in HPI.  Constitutional and vital signs reviewed.      All other systems reviewed and negative except as noted above.                  Physical Exam     ED Triage Vitals [04/17/25 1524]   /84   Pulse 69   Resp 18   Temp 98 °F (36.7 °C)   Temp src Oral   SpO2 100 %   O2 Device None (Room air)       Current Vitals:   Vital Signs  BP: 123/84  Pulse: 69  Resp: 18  Temp: 98 °F (36.7 °C)  Temp src: Oral    Oxygen Therapy  SpO2: 100 %  O2 Device: None (Room air)        Physical Exam  Vitals and nursing note reviewed.   Constitutional:       General: She is not in acute distress.     Appearance: Normal appearance. She is not ill-appearing, toxic-appearing or diaphoretic.   Musculoskeletal:      Right hand: Swelling and tenderness present. Decreased range of motion (d/t swelling). Normal strength. Normal sensation. There is no disruption of two-point discrimination. Normal capillary refill. Normal pulse.        Arms:    Skin:     General: Skin is warm and dry.      Findings: Wound (tiny wound to volar pad of 5th digit. Erythema, and edema. No drainage. No palpable or visible fb.) present.   Neurological:      Mental Status: She is alert.   Psychiatric:         Mood and Affect: Mood normal.           Physical Exam                ED Course   Labs Reviewed - No data to display       Results            MDM      Medical Decision Making  65-year-old female presents with complaint of tenderness, erythema, and swelling to the right fifth digit after carrying a pile of wood about 2 days ago.    Differential diagnosis includes foreign body, cellulitis, less likely fracture/dislocation.  On exam patient is well-appearing with normal vitals.  Examination of the fifth digit does not reveal an obvious foreign body, no palpable mass.  X-ray does not reveal an obvious foreign body, discussed with patient that if symptoms persist she should follow-up with Ortho hand in which they might be  able to perform an ultrasound.  Will send Rx Cefuroxime, warm soaks.   If worsening symptoms, in the next 2 days call to schedule an appt with Ortho hand.   Pt understands and is agreeable to plan.      Amount and/or Complexity of Data Reviewed  External Data Reviewed: notes.  Radiology: ordered.    Risk  OTC drugs.  Prescription drug management.        Disposition and Plan     Clinical Impression:  1. Cellulitis of finger of right hand    2. Swelling of finger         Disposition:  Discharge  4/17/2025  4:07 pm    Follow-up:  Imelda Portillo PA  [address]  [address]  [facility] [id number] [phone]      Ortho Hand, If symptoms worsen    Susan March MD  [address] [facility]  [address]  [facility] [address] [phone]      As needed          Medications Prescribed:  Discharge Medication List as of 4/17/2025  4:07 PM        START taking these medications    Details   cefuroxime 500 MG Oral Tab Take 1 tablet (500 mg total) by mouth 2 (two) times daily for 5 days., Normal, Disp-10 tablet, R-0             Supplementary Documentation:

## 2025-07-25 NOTE — PROGRESS NOTES
[facility]    CHIEF COMPLAINT:   Chief Complaint   Patient presents with    Routine Physical     Reviewed Preventative/Wellness form with patient. 5/23/22-normal pap, neg HPV. 88/24-mammo. 10/10/22-colon-repeat 7 years            The following individual(s) verbally consented to be recorded using ambient AI listening technology and understand that they can each withdraw their consent to this listening technology at any point by asking the clinician to turn off or pause the recording:    Patient name: Ella Cook  Additional names:  none          HPI:   Ella Cook is a 65 year old female who presents for a complete physical exam. Symptoms: denies discharge, itching, burning or dysuria.     Pap done 5/2022 negative with negative hpv.   mammo done 8/2024, ordered for this year.   Colonoscopy done 10/2022, repeat in 7 years. Had an adenoma.   Dexa done 6/2024 per endocrine. She has osteoporosis.      Up to date td.   Due shingrix.   Due prevnar 20. Declines today.   Declines covid booster.   She had a bat exposure and the bat was positive for rabies. She got the rabies vaccine last year for this. No issues.     Exercises regularly. Does yoga, pilates and walks.     Derm: sees derm regularly.      Osteoporosis: off fosamax. She didn't see the new endocrinologist. Last dexa about a year ago showed osteoporosis still and she stopped the fosamax around the same time. Okay with restarting it.      She had right knee surgery last year for prepatellar bursitis. Doing well.      She is a teacher.     Needs labs.     History of Present Illness  She has a history of osteoporosis and previously took Fosamax starting in 2022, which she discontinued in the summer of 2024. She has not been on any osteoporosis medication for almost a year. She exercises daily and feels strong, without any falls.    She experiences soreness in her left breast, describing it as feeling 'full' and similar to early menopause symptoms. She  performs self-checks and has not found any lumps. The soreness is intermittent and sometimes feels like she is pregnant. She has dense breasts and typically receives callbacks for additional views during mammograms. She performs regular self-exams and is vigilant about changes.    She has a history of a recurrent rash on her right lower back, which was previously diagnosed as herpes zoster in college. The rash flares up occasionally, causing soreness and leg pain. She is unsure if the diagnosis was accurate and is open to further evaluation. Has a flare now but it is drying up.     She underwent knee surgery, which she reports went well. There are no current issues with her knees.    She has a history of bat exposure last year, which required her to undergo rabies vaccination. A small colony of bats was found in her attic, which has since been addressed by a pest control company.       Wt Readings from Last 6 Encounters:   07/26/25 116 lb 12.8 oz (53 kg)   12/31/24 120 lb (54.4 kg)   11/04/24 123 lb 9.6 oz (56.1 kg)   10/09/24 120 lb (54.4 kg)   09/26/24 120 lb (54.4 kg)   08/02/24 121 lb 3.2 oz (55 kg)     Body mass index is 19.74 kg/m².       Current Medications[1]   Past Medical History[2]   Past Surgical History[3]   Family History[4]   Social History:   Short Social Hx on File[5]  Occ: teacher. : yes. Children: yes.   Exercise: see hpi.  Diet: watches calories closely     REVIEW OF SYSTEMS:   GENERAL: feels well otherwise  SKIN: denies any unusual skin lesions  EYES:denies blurred vision or double vision  HEENT: denies nasal congestion, sinus pain or ST  LUNGS: denies shortness of breath with exertion  CARDIOVASCULAR: denies chest pain on exertion  GI: denies abdominal pain,denies heartburn  : denies dysuria, vaginal discharge or itching  MUSCULOSKELETAL: denies back pain  NEURO: denies headaches  PSYCHE: denies depression or anxiety  HEMATOLOGIC: denies hx of anemia  ENDOCRINE: denies thyroid  history  ALL/ASTHMA: denies hx of allergy or asthma    EXAM:   /70 (BP Location: Left arm, Patient Position: Sitting, Cuff Size: adult)   Pulse 64   Temp 97.6 °F (36.4 °C) (Temporal)   Resp 12   Ht 5' 4.5\" (1.638 m)   Wt 116 lb 12.8 oz (53 kg)   LMP 03/12/2015 (Approximate)   Breastfeeding No   BMI 19.74 kg/m²   Body mass index is 19.74 kg/m².   GENERAL: well developed, well nourished,in no apparent distress  SKIN: no rashes,no suspicious lesions  HEENT: atraumatic, normocephalic,ears and throat are clear  EYES:PERRLA, conjunctiva are clear  NECK: supple,no adenopathy,no bruits  CHEST: no chest tenderness  LUNGS: clear to auscultation  CARDIO: nl s1 and s2, RRR without murmur  GI: good BS's,no masses, HSM or tenderness  BREAST: no dominant or suspicious mass, no nipple discharge  GENITAL/URINARY:  External Genitalia:  General appearance; normal, Hair distribution; normal, Lesions absent, Urethral Meatus:  Size normal, Location normal, Lesions absent, Prolapse absent, Vagina:  General appearance normal, Lesions absent, Pelvic support normal, Cervix:  General appearance normal, Discharge absent, Tenderness absent, Enlargement absent, Uterus:  Masses absent, Adnexa:  Masses absent, Tenderness absent, Anus/Perineum:  Lesions absent and Masses absent  No pap today.   MUSCULOSKELETAL: back is not tender,FROM of the back  EXTREMITIES: no cyanosis, clubbing or edema  NEURO: Oriented times three,cranial nerves are intact,motor and sensory are grossly intact    Labs:   Lab Results   Component Value Date/Time    WBC 4.4 08/02/2024 09:22 AM    HGB 12.4 08/02/2024 09:22 AM    .0 08/02/2024 09:22 AM      Lab Results   Component Value Date/Time    GLU 89 08/02/2024 09:22 AM     08/02/2024 09:22 AM    K 3.9 08/02/2024 09:22 AM     08/02/2024 09:22 AM    CO2 29.0 08/02/2024 09:22 AM    CREATSERUM 0.75 08/02/2024 09:22 AM    CA 9.9 08/02/2024 09:22 AM    ALB 4.3 08/02/2024 09:22 AM    TP 7.0  08/02/2024 09:22 AM    ALKPHO 70 08/02/2024 09:22 AM    AST 23 08/02/2024 09:22 AM    ALT 18 08/02/2024 09:22 AM    BILT 0.4 08/02/2024 09:22 AM    TSH 1.219 08/02/2024 09:22 AM    T4F 1.0 04/25/2014 06:20 AM        Lab Results   Component Value Date/Time    CHOLEST 168 08/02/2024 09:22 AM    HDL 66 (H) 08/02/2024 09:22 AM    TRIG 57 08/02/2024 09:22 AM    LDL 91 08/02/2024 09:22 AM    NONHDLC 102 08/02/2024 09:22 AM       Lab Results   Component Value Date/Time    A1C 5.4 06/23/2023 08:43 AM      Vitamin D:    Lab Results   Component Value Date    VITD 35.0 08/02/2024           ASSESSMENT AND PLAN:   Ella Cook is a 65 year old female who presents for a complete physical exam.   1. Physical exam, annual  Do labs.   Pelvic and breast exam done.   Other HM discussed.   Immunizations discussed.   Continue regular exercise.   - CBC With Differential With Platelet; Future  - Comp Metabolic Panel; Future  - Lipid Panel; Future  - Hemoglobin A1C; Future  - TSH W Reflex To Free T4; Future  - Vitamin D, 25-Hydroxy; Future    3. Screening for endocrine, metabolic and immunity disorder  - Hemoglobin A1C; Future    4. Mastodynia of left breast  Will check diagnostic mammogram.   She has dense breasts as well.  - [place] [facility] BERNARDINO 2D+3D DIAGNOSTIC FILIPPO  BILAT (CPT=77066/37934); Future    5. Age-related osteoporosis without current pathological fracture  Restart alendronate. She tolerated this well in the past.   Started in 2022 with endocrine and stopped in summer 2024.   - alendronate 70 MG Oral Tab; Take 1 tablet (70 mg total) by mouth every 7 days.  Dispense: 12 tablet; Refill: 3    6. Rash  Unlikely to be herpes zoster, could be herpes simplex. I would recommend we culture this when it is active. She can come back whenever she has it and I can do a culture. No appt needed if I do it. But may need an appt if I am not available and has to see someone else.   Discussed valtrex suppression which can be considered but I recommend  checking a culture first.       Return in about 1 year (around 7/26/2026) for physical.      Susan March MD         [1]   Current Outpatient Medications   Medication Sig Dispense Refill    alendronate 70 MG Oral Tab Take 1 tablet (70 mg total) by mouth every 7 days. 12 tablet 3    cholecalciferol 50 MCG (2000 UT) Oral Tab Take 2 tablets (4,000 Units total) by mouth daily.      Fluocinonide 0.05 % External Ointment Apply 1 Dose topically as needed.  2   [2]   Past Medical History:   Age-related osteoporosis without current pathological fracture    Food intolerance    Stress incontinence   [3]   Past Surgical History:  Procedure Laterality Date    Biopsy of breast, needle core  at 24 yrs old    Colonoscopy      Colonoscopy & polypectomy  8/11/15    repeat in 5 years    Needle biopsy left      1984 bn   [4]   Family History  Problem Relation Age of Onset    Heart Attack Father 65        smoker    Heart Disorder Father     Dementia Mother     Other (Alzheimer's Disease) Mother     Cancer Paternal Grandmother     Lung Disorder Paternal Grandfather         COPD    Other (RA) Sister 64    Other (colon polyps) Sister     Cancer Brother         Melanoma    Heart Disorder Brother 65        AF, AV replacement, and CABG x 2   [5]   Social History  Socioeconomic History    Marital status:    Tobacco Use    Smoking status: Never     Passive exposure: Never    Smokeless tobacco: Never   Vaping Use    Vaping status: Never Used   Substance and Sexual Activity    Alcohol use: Yes     Comment: approx 0-2 drinks per week    Drug use: No   Other Topics Concern    Caffeine Concern Yes    Stress Concern No    Weight Concern No    Special Diet No    Exercise Yes    Seat Belt Yes     Social Drivers of Health     Food Insecurity: No Food Insecurity (7/26/2025)    NCSS - Food Insecurity     Worried About Running Out of Food in the Last Year: No     Ran Out of Food in the Last Year: No   Transportation Needs: No Transportation Needs  (7/26/2025)    NCSS - Transportation     Lack of Transportation: No   Housing Stability: Not At Risk (7/26/2025)    NCSS - Housing/Utilities     Has Housing: Yes     Worried About Losing Housing: No     Unable to Get Utilities: No

## 2025-07-26 ENCOUNTER — OFFICE VISIT (OUTPATIENT)
Dept: INTERNAL MEDICINE CLINIC | Facility: CLINIC | Age: 65
End: 2025-07-26
Payer: COMMERCIAL

## 2025-07-26 VITALS
WEIGHT: 116.81 LBS | DIASTOLIC BLOOD PRESSURE: 70 MMHG | HEIGHT: 64.5 IN | SYSTOLIC BLOOD PRESSURE: 110 MMHG | RESPIRATION RATE: 12 BRPM | HEART RATE: 64 BPM | BODY MASS INDEX: 19.7 KG/M2 | TEMPERATURE: 98 F

## 2025-07-26 DIAGNOSIS — R21 RASH: ICD-10-CM

## 2025-07-26 DIAGNOSIS — Z13.0 SCREENING FOR ENDOCRINE, METABOLIC AND IMMUNITY DISORDER: ICD-10-CM

## 2025-07-26 DIAGNOSIS — M81.0 AGE-RELATED OSTEOPOROSIS WITHOUT CURRENT PATHOLOGICAL FRACTURE: ICD-10-CM

## 2025-07-26 DIAGNOSIS — Z13.228 SCREENING FOR ENDOCRINE, METABOLIC AND IMMUNITY DISORDER: ICD-10-CM

## 2025-07-26 DIAGNOSIS — Z13.29 SCREENING FOR ENDOCRINE, METABOLIC AND IMMUNITY DISORDER: ICD-10-CM

## 2025-07-26 DIAGNOSIS — Z00.00 PHYSICAL EXAM, ANNUAL: Primary | ICD-10-CM

## 2025-07-26 DIAGNOSIS — Z12.31 ENCOUNTER FOR SCREENING MAMMOGRAM FOR MALIGNANT NEOPLASM OF BREAST: ICD-10-CM

## 2025-07-26 DIAGNOSIS — N64.4 MASTODYNIA OF LEFT BREAST: ICD-10-CM

## 2025-07-26 RX ORDER — ALENDRONATE SODIUM 70 MG/1
70 TABLET ORAL
Qty: 12 TABLET | Refills: 3 | Status: SHIPPED | OUTPATIENT
Start: 2025-07-26

## 2025-07-29 ENCOUNTER — LAB ENCOUNTER (OUTPATIENT)
Dept: LAB | Age: 65
End: 2025-07-29
Attending: INTERNAL MEDICINE

## 2025-07-29 DIAGNOSIS — Z13.0 SCREENING FOR ENDOCRINE, METABOLIC AND IMMUNITY DISORDER: ICD-10-CM

## 2025-07-29 DIAGNOSIS — Z13.29 SCREENING FOR ENDOCRINE, METABOLIC AND IMMUNITY DISORDER: ICD-10-CM

## 2025-07-29 DIAGNOSIS — Z13.228 SCREENING FOR ENDOCRINE, METABOLIC AND IMMUNITY DISORDER: ICD-10-CM

## 2025-07-29 DIAGNOSIS — Z00.00 PHYSICAL EXAM, ANNUAL: ICD-10-CM

## 2025-07-29 LAB
ALBUMIN SERPL-MCNC: 4.3 G/DL (ref 3.2–4.8)
ALBUMIN/GLOB SERPL: 1.8 (ref 1–2)
ALP LIVER SERPL-CCNC: 68 U/L (ref 50–130)
ALT SERPL-CCNC: 13 U/L (ref 10–49)
ANION GAP SERPL CALC-SCNC: 7 MMOL/L (ref 0–18)
AST SERPL-CCNC: 21 U/L (ref ?–34)
BASOPHILS # BLD AUTO: 0.02 X10(3) UL (ref 0–0.2)
BASOPHILS NFR BLD AUTO: 0.5 %
BILIRUB SERPL-MCNC: 0.7 MG/DL (ref 0.2–1.1)
BUN BLD-MCNC: 12 MG/DL (ref 9–23)
CALCIUM BLD-MCNC: 9.8 MG/DL (ref 8.7–10.6)
CHLORIDE SERPL-SCNC: 105 MMOL/L (ref 98–112)
CHOLEST SERPL-MCNC: 215 MG/DL (ref ?–200)
CO2 SERPL-SCNC: 30 MMOL/L (ref 21–32)
CREAT BLD-MCNC: 0.83 MG/DL (ref 0.55–1.02)
EGFRCR SERPLBLD CKD-EPI 2021: 79 ML/MIN/1.73M2 (ref 60–?)
EOSINOPHIL # BLD AUTO: 0.08 X10(3) UL (ref 0–0.7)
EOSINOPHIL NFR BLD AUTO: 2 %
ERYTHROCYTE [DISTWIDTH] IN BLOOD BY AUTOMATED COUNT: 11.9 %
EST. AVERAGE GLUCOSE BLD GHB EST-MCNC: 105 MG/DL (ref 68–126)
FASTING PATIENT LIPID ANSWER: YES
FASTING STATUS PATIENT QL REPORTED: YES
GLOBULIN PLAS-MCNC: 2.4 G/DL (ref 2–3.5)
GLUCOSE BLD-MCNC: 88 MG/DL (ref 70–99)
HBA1C MFR BLD: 5.3 % (ref ?–5.7)
HCT VFR BLD AUTO: 39.2 % (ref 35–48)
HDLC SERPL-MCNC: 91 MG/DL (ref 40–59)
HGB BLD-MCNC: 13.3 G/DL (ref 12–16)
IMM GRANULOCYTES # BLD AUTO: 0.01 X10(3) UL (ref 0–1)
IMM GRANULOCYTES NFR BLD: 0.3 %
LDLC SERPL CALC-MCNC: 111 MG/DL (ref ?–100)
LYMPHOCYTES # BLD AUTO: 1.62 X10(3) UL (ref 1–4)
LYMPHOCYTES NFR BLD AUTO: 40.9 %
MCH RBC QN AUTO: 32.8 PG (ref 26–34)
MCHC RBC AUTO-ENTMCNC: 33.9 G/DL (ref 31–37)
MCV RBC AUTO: 96.8 FL (ref 80–100)
MONOCYTES # BLD AUTO: 0.46 X10(3) UL (ref 0.1–1)
MONOCYTES NFR BLD AUTO: 11.6 %
NEUTROPHILS # BLD AUTO: 1.77 X10 (3) UL (ref 1.5–7.7)
NEUTROPHILS # BLD AUTO: 1.77 X10(3) UL (ref 1.5–7.7)
NEUTROPHILS NFR BLD AUTO: 44.7 %
NONHDLC SERPL-MCNC: 124 MG/DL (ref ?–130)
OSMOLALITY SERPL CALC.SUM OF ELEC: 293 MOSM/KG (ref 275–295)
PLATELET # BLD AUTO: 238 10(3)UL (ref 150–450)
POTASSIUM SERPL-SCNC: 4.6 MMOL/L (ref 3.5–5.1)
PROT SERPL-MCNC: 6.7 G/DL (ref 5.7–8.2)
RBC # BLD AUTO: 4.05 X10(6)UL (ref 3.8–5.3)
SODIUM SERPL-SCNC: 142 MMOL/L (ref 136–145)
TRIGL SERPL-MCNC: 74 MG/DL (ref 30–149)
TSI SER-ACNC: 1.84 UIU/ML (ref 0.55–4.78)
VIT D+METAB SERPL-MCNC: 41.4 NG/ML (ref 30–100)
VLDLC SERPL CALC-MCNC: 13 MG/DL (ref 0–30)
WBC # BLD AUTO: 4 X10(3) UL (ref 4–11)

## 2025-07-29 PROCEDURE — 84443 ASSAY THYROID STIM HORMONE: CPT

## 2025-07-29 PROCEDURE — 80061 LIPID PANEL: CPT

## 2025-07-29 PROCEDURE — 82306 VITAMIN D 25 HYDROXY: CPT

## 2025-07-29 PROCEDURE — 36415 COLL VENOUS BLD VENIPUNCTURE: CPT

## 2025-07-29 PROCEDURE — 83036 HEMOGLOBIN GLYCOSYLATED A1C: CPT

## 2025-07-29 PROCEDURE — 80053 COMPREHEN METABOLIC PANEL: CPT

## 2025-07-29 PROCEDURE — 85025 COMPLETE CBC W/AUTO DIFF WBC: CPT

## 2025-08-22 ENCOUNTER — HOSPITAL ENCOUNTER (OUTPATIENT)
Dept: MAMMOGRAPHY | Facility: HOSPITAL | Age: 65
Discharge: HOME | End: 2025-08-22
Attending: INTERNAL MEDICINE

## 2025-08-22 DIAGNOSIS — N64.4 MASTODYNIA OF LEFT BREAST: ICD-10-CM

## 2025-08-22 PROCEDURE — 76641 ULTRASOUND BREAST COMPLETE: CPT | Performed by: INTERNAL MEDICINE

## 2025-08-22 PROCEDURE — 77066 DX MAMMO INCL CAD BI: CPT | Performed by: INTERNAL MEDICINE

## 2025-08-22 PROCEDURE — 77062 BREAST TOMOSYNTHESIS BI: CPT | Performed by: INTERNAL MEDICINE

## (undated) NOTE — LETTER
07/10/17        Yemi Black 58  [address] [facility],[address]    1/12/1960     Dear Mercedes Paz,    [facility] records indicate that you have outstanding lab work and or testing that was ordered for you and has not yet been completed:          Lipid Panel      CBC W Dif

## (undated) NOTE — LETTER
09/27/18        [address] [place] [address]      Dear Ena Martinze,    [facility] records indicate that you have outstanding lab work and or testing that was ordered for you and has not yet been completed:  Orders Placed This Encounter      CMP      CBC

## (undated) NOTE — LETTER
04/06/21    [address] [place] [place]    Dear Kian Mckinney,    This is a friendly reminder to let you know you have outstanding lab work as listed below.   To schedule an appointment, please go to [employer] under Menu/Schedule An Appointment/Laborat